# Patient Record
Sex: FEMALE | Race: WHITE | NOT HISPANIC OR LATINO | Employment: FULL TIME | URBAN - METROPOLITAN AREA
[De-identification: names, ages, dates, MRNs, and addresses within clinical notes are randomized per-mention and may not be internally consistent; named-entity substitution may affect disease eponyms.]

---

## 2017-10-09 ENCOUNTER — TRANSCRIBE ORDERS (OUTPATIENT)
Dept: ADMINISTRATIVE | Facility: HOSPITAL | Age: 47
End: 2017-10-09

## 2017-10-09 DIAGNOSIS — E04.2 NONTOXIC MULTINODULAR GOITER: ICD-10-CM

## 2017-10-09 DIAGNOSIS — E04.2 MULTIPLE THYROID NODULES: Primary | ICD-10-CM

## 2017-10-09 DIAGNOSIS — Z00.00 ROUTINE GENERAL MEDICAL EXAMINATION AT A HEALTH CARE FACILITY: ICD-10-CM

## 2017-10-09 DIAGNOSIS — E55.9 VITAMIN D INSUFFICIENCY: ICD-10-CM

## 2017-10-09 DIAGNOSIS — R53.83 OTHER FATIGUE: ICD-10-CM

## 2017-10-10 ENCOUNTER — APPOINTMENT (OUTPATIENT)
Dept: LAB | Facility: HOSPITAL | Age: 47
End: 2017-10-10
Attending: INTERNAL MEDICINE
Payer: COMMERCIAL

## 2017-10-10 ENCOUNTER — TRANSCRIBE ORDERS (OUTPATIENT)
Dept: ADMINISTRATIVE | Facility: HOSPITAL | Age: 47
End: 2017-10-10

## 2017-10-10 ENCOUNTER — HOSPITAL ENCOUNTER (OUTPATIENT)
Dept: RADIOLOGY | Facility: HOSPITAL | Age: 47
Discharge: HOME | End: 2017-10-10
Attending: INTERNAL MEDICINE
Payer: COMMERCIAL

## 2017-10-10 DIAGNOSIS — E04.2 MULTIPLE THYROID NODULES: ICD-10-CM

## 2017-10-10 DIAGNOSIS — L40.0 PSORIASIS VULGARIS: ICD-10-CM

## 2017-10-10 DIAGNOSIS — E04.2 NONTOXIC MULTINODULAR GOITER: ICD-10-CM

## 2017-10-10 DIAGNOSIS — Z00.00 ROUTINE GENERAL MEDICAL EXAMINATION AT A HEALTH CARE FACILITY: ICD-10-CM

## 2017-10-10 DIAGNOSIS — Z79.899 ENCOUNTER FOR LONG-TERM (CURRENT) USE OF MEDICATIONS: ICD-10-CM

## 2017-10-10 DIAGNOSIS — R53.83 OTHER FATIGUE: ICD-10-CM

## 2017-10-10 DIAGNOSIS — L40.0 PSORIASIS VULGARIS: Primary | ICD-10-CM

## 2017-10-10 DIAGNOSIS — E55.9 VITAMIN D INSUFFICIENCY: ICD-10-CM

## 2017-10-10 LAB
25(OH)D3 SERPL-MCNC: 18.6 NG/ML (ref 30–100)
ALBUMIN SERPL BCP-MCNC: 3.7 G/DL (ref 3.5–5)
ALP SERPL-CCNC: 68 U/L (ref 46–116)
ALT SERPL W P-5'-P-CCNC: 28 U/L (ref 12–78)
ANION GAP SERPL CALCULATED.3IONS-SCNC: 9 MMOL/L (ref 4–13)
AST SERPL W P-5'-P-CCNC: 17 U/L (ref 5–45)
BASOPHILS # BLD AUTO: 0 THOUSANDS/ΜL (ref 0–0.1)
BASOPHILS NFR BLD AUTO: 0 % (ref 0–1)
BILIRUB DIRECT SERPL-MCNC: 0.2 MG/DL (ref 0–0.2)
BILIRUB SERPL-MCNC: 0.5 MG/DL (ref 0.2–1)
BILIRUB UR QL STRIP: NEGATIVE
BUN SERPL-MCNC: 11 MG/DL (ref 5–25)
CALCIUM SERPL-MCNC: 9.6 MG/DL (ref 8.3–10.1)
CHLORIDE SERPL-SCNC: 103 MMOL/L (ref 100–108)
CHOLEST SERPL-MCNC: 159 MG/DL (ref 50–200)
CLARITY UR: CLEAR
CO2 SERPL-SCNC: 28 MMOL/L (ref 21–32)
COLOR UR: YELLOW
CREAT SERPL-MCNC: 0.7 MG/DL (ref 0.6–1.3)
EOSINOPHIL # BLD AUTO: 0 THOUSAND/ΜL (ref 0–0.61)
EOSINOPHIL NFR BLD AUTO: 1 % (ref 0–6)
ERYTHROCYTE [DISTWIDTH] IN BLOOD BY AUTOMATED COUNT: 12.3 % (ref 11.6–15.1)
FERRITIN SERPL-MCNC: 44 NG/ML (ref 8–388)
GFR SERPL CREATININE-BSD FRML MDRD: 105 ML/MIN/1.73SQ M
GLUCOSE P FAST SERPL-MCNC: 99 MG/DL (ref 65–99)
GLUCOSE UR STRIP-MCNC: NEGATIVE MG/DL
HCT VFR BLD AUTO: 40.9 % (ref 37–47)
HDLC SERPL-MCNC: 100 MG/DL (ref 40–60)
HGB BLD-MCNC: 13.5 G/DL (ref 12–16)
HGB UR QL STRIP.AUTO: NEGATIVE
KETONES UR STRIP-MCNC: ABNORMAL MG/DL
LDLC SERPL CALC-MCNC: 46 MG/DL (ref 0–100)
LEUKOCYTE ESTERASE UR QL STRIP: NEGATIVE
LYMPHOCYTES # BLD AUTO: 1.4 THOUSANDS/ΜL (ref 0.6–4.47)
LYMPHOCYTES NFR BLD AUTO: 21 % (ref 14–44)
MCH RBC QN AUTO: 30.9 PG (ref 27–31)
MCHC RBC AUTO-ENTMCNC: 33.1 G/DL (ref 31.4–37.4)
MCV RBC AUTO: 93 FL (ref 82–98)
MONOCYTES # BLD AUTO: 0.3 THOUSAND/ΜL (ref 0.17–1.22)
MONOCYTES NFR BLD AUTO: 5 % (ref 4–12)
NEUTROPHILS # BLD AUTO: 4.8 THOUSANDS/ΜL (ref 1.85–7.62)
NEUTS SEG NFR BLD AUTO: 73 % (ref 43–75)
NITRITE UR QL STRIP: NEGATIVE
NRBC BLD AUTO-RTO: 0 /100 WBCS
PH UR STRIP.AUTO: 6.5 [PH] (ref 5–9)
PLATELET # BLD AUTO: 307 THOUSANDS/UL (ref 130–400)
PMV BLD AUTO: 6.6 FL (ref 8.9–12.7)
POTASSIUM SERPL-SCNC: 4.1 MMOL/L (ref 3.5–5.3)
PROT SERPL-MCNC: 7 G/DL (ref 6.4–8.2)
PROT UR STRIP-MCNC: NEGATIVE MG/DL
RBC # BLD AUTO: 4.38 MILLION/UL (ref 4.2–5.4)
SODIUM SERPL-SCNC: 140 MMOL/L (ref 136–145)
SP GR UR STRIP.AUTO: 1.02 (ref 1–1.03)
T4 FREE SERPL-MCNC: 1.01 NG/DL (ref 0.76–1.46)
TRIGL SERPL-MCNC: 64 MG/DL
TSH SERPL DL<=0.05 MIU/L-ACNC: 1.33 UIU/ML (ref 0.36–3.74)
UROBILINOGEN UR QL STRIP.AUTO: 0.2 E.U./DL
VIT B12 SERPL-MCNC: 387 PG/ML (ref 100–900)
WBC # BLD AUTO: 6.6 THOUSAND/UL (ref 4.8–10.8)

## 2017-10-10 PROCEDURE — 82306 VITAMIN D 25 HYDROXY: CPT

## 2017-10-10 PROCEDURE — 81003 URINALYSIS AUTO W/O SCOPE: CPT | Performed by: INTERNAL MEDICINE

## 2017-10-10 PROCEDURE — 36415 COLL VENOUS BLD VENIPUNCTURE: CPT | Performed by: DENTIST

## 2017-10-10 PROCEDURE — 85025 COMPLETE CBC W/AUTO DIFF WBC: CPT | Performed by: DENTIST

## 2017-10-10 PROCEDURE — 80061 LIPID PANEL: CPT

## 2017-10-10 PROCEDURE — 76536 US EXAM OF HEAD AND NECK: CPT

## 2017-10-10 PROCEDURE — 82607 VITAMIN B-12: CPT

## 2017-10-10 PROCEDURE — 84439 ASSAY OF FREE THYROXINE: CPT

## 2017-10-10 PROCEDURE — 80053 COMPREHEN METABOLIC PANEL: CPT | Performed by: DENTIST

## 2017-10-10 PROCEDURE — 84443 ASSAY THYROID STIM HORMONE: CPT

## 2017-10-10 PROCEDURE — 82728 ASSAY OF FERRITIN: CPT

## 2017-10-10 PROCEDURE — 86480 TB TEST CELL IMMUN MEASURE: CPT

## 2017-10-10 PROCEDURE — 82248 BILIRUBIN DIRECT: CPT

## 2017-10-12 LAB
ANNOTATION COMMENT IMP: NORMAL
GAMMA INTERFERON BACKGROUND BLD IA-ACNC: 0.04 IU/ML
M TB IFN-G BLD-IMP: NEGATIVE
M TB IFN-G CD4+ BCKGRND COR BLD-ACNC: <0.01 IU/ML
M TB IFN-G CD4+ T-CELLS BLD-ACNC: 0.03 IU/ML
MITOGEN IGNF BLD-ACNC: 8.71 IU/ML
QUANTIFERON-TB GOLD IN TUBE: NORMAL
SERVICE CMNT-IMP: NORMAL

## 2019-03-04 ENCOUNTER — APPOINTMENT (OUTPATIENT)
Dept: RADIOLOGY | Facility: CLINIC | Age: 49
End: 2019-03-04
Payer: COMMERCIAL

## 2019-03-04 ENCOUNTER — OFFICE VISIT (OUTPATIENT)
Dept: URGENT CARE | Facility: CLINIC | Age: 49
End: 2019-03-04
Payer: COMMERCIAL

## 2019-03-04 VITALS
TEMPERATURE: 99.3 F | HEART RATE: 82 BPM | WEIGHT: 136 LBS | BODY MASS INDEX: 25.03 KG/M2 | OXYGEN SATURATION: 100 % | DIASTOLIC BLOOD PRESSURE: 80 MMHG | HEIGHT: 62 IN | RESPIRATION RATE: 18 BRPM | SYSTOLIC BLOOD PRESSURE: 102 MMHG

## 2019-03-04 DIAGNOSIS — S92.301A CLOSED NONDISPLACED FRACTURE OF METATARSAL BONE OF RIGHT FOOT, UNSPECIFIED METATARSAL, INITIAL ENCOUNTER: Primary | ICD-10-CM

## 2019-03-04 DIAGNOSIS — S99.911A ANKLE INJURY, RIGHT, INITIAL ENCOUNTER: ICD-10-CM

## 2019-03-04 PROCEDURE — 99214 OFFICE O/P EST MOD 30 MIN: CPT | Performed by: PHYSICIAN ASSISTANT

## 2019-03-04 PROCEDURE — 73610 X-RAY EXAM OF ANKLE: CPT

## 2019-03-04 PROCEDURE — 73630 X-RAY EXAM OF FOOT: CPT

## 2019-03-04 RX ORDER — MULTIVITAMIN
1 TABLET ORAL DAILY
COMMUNITY
End: 2021-12-30 | Stop reason: ALTCHOICE

## 2019-03-04 RX ORDER — CHOLECALCIFEROL (VITAMIN D3) 50 MCG
2000 TABLET ORAL DAILY
COMMUNITY
End: 2021-12-30 | Stop reason: ALTCHOICE

## 2019-03-04 RX ORDER — DESONIDE 0.5 MG/G
CREAM TOPICAL DAILY PRN
Refills: 0 | COMMUNITY
Start: 2019-01-07

## 2019-03-04 RX ORDER — MOMETASONE FUROATE 1 MG/G
CREAM TOPICAL DAILY PRN
Refills: 1 | COMMUNITY
Start: 2019-01-05 | End: 2021-12-30 | Stop reason: ALTCHOICE

## 2019-03-04 RX ORDER — LORATADINE AND PSEUDOEPHEDRINE 10; 240 MG/1; MG/1
1 TABLET, EXTENDED RELEASE ORAL DAILY PRN
COMMUNITY
End: 2021-12-30 | Stop reason: ALTCHOICE

## 2019-03-04 RX ORDER — NORGESTIMATE AND ETHINYL ESTRADIOL
1 KIT DAILY
Refills: 3 | COMMUNITY
Start: 2019-01-02 | End: 2021-12-30 | Stop reason: ALTCHOICE

## 2019-03-04 NOTE — PATIENT INSTRUCTIONS
Continue to rest and elevate your foot and ankle when possible  Apply ice for 20-30 minutes at a time, at least 3 to 4 times a day  Wear compression device during all waking hours  You may remove this at bedtime  Wear orthowedge shoe continuously  Use crutches for all walking avoiding any pressure application to the foot  Follow up with Orthopedics in the next 1-3 days  Proceed to the ER if symptoms worsen

## 2019-03-05 ENCOUNTER — TELEPHONE (OUTPATIENT)
Dept: URGENT CARE | Facility: CLINIC | Age: 49
End: 2019-03-05

## 2019-03-05 ENCOUNTER — OFFICE VISIT (OUTPATIENT)
Dept: OBGYN CLINIC | Facility: CLINIC | Age: 49
End: 2019-03-05
Payer: COMMERCIAL

## 2019-03-05 VITALS — SYSTOLIC BLOOD PRESSURE: 121 MMHG | HEART RATE: 80 BPM | DIASTOLIC BLOOD PRESSURE: 82 MMHG

## 2019-03-05 DIAGNOSIS — S93.601A FOOT SPRAIN, RIGHT, INITIAL ENCOUNTER: ICD-10-CM

## 2019-03-05 DIAGNOSIS — S93.401A SPRAIN OF UNSPECIFIED LIGAMENT OF RIGHT ANKLE, INITIAL ENCOUNTER: Primary | ICD-10-CM

## 2019-03-05 PROCEDURE — 99203 OFFICE O/P NEW LOW 30 MIN: CPT | Performed by: ORTHOPAEDIC SURGERY

## 2019-03-05 NOTE — PROGRESS NOTES
Assessment/Plan:  1  Sprain of unspecified ligament of right ankle, initial encounter     2  Foot sprain, right, initial encounter         Scribe Attestation    I,:   Nikki Patricio am acting as a scribe while in the presence of the attending physician :        I,:   Svitlana Zapata, DO personally performed the services described in this documentation    as scribed in my presence :            Doroteo Huynh has right sided ankle and foot pain consistent with a lateral ankle sprain and foot sprain  She can discontinue the use of the crutches and post-op shoe as tolerated  She was given a lace up ankle brace at today's appointment  She should begin gentle range of motion exercises  She can continue with the ice as needed  She will follow up in our office in 3 weeks if she has continued pain  Subjective:   Eugenie Moulton is a 54 y o  female who presents for right sided ankle and foot pain  She states 2 weeks ago she was walking at home and mis-stepped causing her to roll her ankle  She had immediate pain and some swelling in the ankle  She treated the ankle and foot conservatively with ice and elevation  Due to her continued swelling, pain and bruising she went to the urgent care yesterday  X-rays were completed and they were concerned about a metatarsal fracture  She was given crutches, an ace wrap, and a post-op shoe  At today's appointment she states her ankle and foot feel better then 2 weeks ago but she is still having pain over the lateral distal fibula, lateral ankle, and dorsum of the foot  She denies any new trauma or injury since the initial injury 2 weeks  She denies any radiating pain, numbness or tingling  Review of Systems   Constitutional: Negative for chills, fever and unexpected weight change  HENT: Negative for hearing loss, nosebleeds and sore throat  Eyes: Negative for pain, redness and visual disturbance  Respiratory: Negative for cough, shortness of breath and wheezing  Cardiovascular: Negative for chest pain, palpitations and leg swelling  Gastrointestinal: Negative for abdominal pain, nausea and vomiting  Endocrine: Negative for polydipsia and polyuria  Genitourinary: Negative for dysuria and hematuria  Musculoskeletal: Positive for arthralgias, joint swelling and myalgias  See HPI   Skin: Negative for rash and wound  Neurological: Negative for dizziness, numbness and headaches  Psychiatric/Behavioral: Negative for decreased concentration and suicidal ideas  The patient is not nervous/anxious  Past Medical History:   Diagnosis Date    Eczema     Multiple thyroid nodules     Psoriasis     Vitamin D deficiency        Past Surgical History:   Procedure Laterality Date    SHOULDER SURGERY Left        History reviewed  No pertinent family history  Social History     Occupational History    Not on file   Tobacco Use    Smoking status: Former Smoker     Types: Cigarettes    Smokeless tobacco: Never Used   Substance and Sexual Activity    Alcohol use:  Yes     Alcohol/week: 2 4 oz     Types: 4 Glasses of wine per week    Drug use: Never    Sexual activity: Not on file         Current Outpatient Medications:     Cholecalciferol (VITAMIN D) 2000 units tablet, Take 2,000 Units by mouth daily, Disp: , Rfl:     desonide (DESOWEN) 0 05 % cream, daily as needed, Disp: , Rfl: 0    loratadine-pseudoephedrine (CLARITIN-D 24-HOUR)  mg per 24 hr tablet, Take 1 tablet by mouth daily as needed for allergies, Disp: , Rfl:     mometasone (ELOCON) 0 1 % cream, daily as needed, Disp: , Rfl: 1    Multiple Vitamin (MULTIVITAMIN) tablet, Take 1 tablet by mouth daily, Disp: , Rfl:     TRI-LO-RENAE 0 18/0 215/0 25 MG-25 MCG per tablet, Take 1 tablet by mouth daily, Disp: , Rfl: 3    Allergies   Allergen Reactions    Celecoxib Swelling     Redness of ears    Penicillins Other (See Comments)     Unknown - childhood       Objective:  Vitals:    03/05/19 1217   BP: 121/82   Pulse: 80       Right Ankle Exam     Tenderness   The patient is experiencing tenderness in the ATF  Swelling: mild    Range of Motion   Dorsiflexion: normal   Plantar flexion: normal   Eversion: normal   Inversion: normal     Muscle Strength   Dorsiflexion:  5/5  Plantar flexion:  5/5  Anterior tibial:  5/5  Posterior tibial:  5/5  Gastrocsoleus:  5/5  Peroneal muscle:  5/5    Other   Sensation: normal     Comments:  Tenderness over mid-distal fibula, peroneals, mild tenderness over 5th metatarsal,   Tenderness over distal metatarsals 3rd-4th    Ecchymosis over the lateral and medial heel  Ecchymosis over the dorsal and plantar aspect of the of the foot  Pain with resisted eversion          Strength/Myotome Testing     Right Ankle/Foot   Dorsiflexion: 5  Plantar flexion: 5      Physical Exam   Constitutional: She is oriented to person, place, and time  She appears well-developed and well-nourished  HENT:   Head: Normocephalic  Nose: Nose normal    Eyes: Conjunctivae and EOM are normal    Neck: Normal range of motion  Cardiovascular: Normal rate and intact distal pulses  Pulmonary/Chest: Effort normal  No respiratory distress  Abdominal: Soft  She exhibits no distension  Musculoskeletal: Normal range of motion  Neurological: She is alert and oriented to person, place, and time  Skin: Skin is warm and dry  Psychiatric: She has a normal mood and affect  Her behavior is normal  Judgment and thought content normal        I have personally reviewed pertinent films in PACS and my interpretation is as follows: Three view right ankle x-ray taken on 3/4/2019 shows no acute fracture or dislocation  Soft tissue swelling over the lateral malleolus is seen  Three view right foot x-ray taken on 3/4/3019 shows no acute fracture or dislocation  Moderate hallux valgus is present

## 2019-03-27 ENCOUNTER — OFFICE VISIT (OUTPATIENT)
Dept: OBGYN CLINIC | Facility: CLINIC | Age: 49
End: 2019-03-27
Payer: COMMERCIAL

## 2019-03-27 VITALS
HEART RATE: 85 BPM | WEIGHT: 136 LBS | BODY MASS INDEX: 25.68 KG/M2 | DIASTOLIC BLOOD PRESSURE: 87 MMHG | SYSTOLIC BLOOD PRESSURE: 125 MMHG | HEIGHT: 61 IN

## 2019-03-27 DIAGNOSIS — S93.401A SPRAIN OF UNSPECIFIED LIGAMENT OF RIGHT ANKLE, INITIAL ENCOUNTER: Primary | ICD-10-CM

## 2019-03-27 PROCEDURE — 99213 OFFICE O/P EST LOW 20 MIN: CPT | Performed by: ORTHOPAEDIC SURGERY

## 2019-03-27 NOTE — PROGRESS NOTES
Assessment/Plan:  1  Sprain of unspecified ligament of right ankle, initial encounter           Johnice Fothergill has right-sided ankle pain consistent with an ankle sprain  I do think she is progressing well and advised her to continue with home exercises  She should gradually increase her range of motion and strength as needed  I also offered sending her to formal physical therapy but she would like to continue exercises on her own first   She can use the brace as needed as well  She will follow up only if her ankle pain persists  Subjective:   Catheline Osgood is a 54 y o  female who presents  For follow-up for right-sided ankle pain  She was last seen in the office 3 weeks ago with symptoms consistent with a right ankle sprain  She had a negative x-ray at that time after ankle inversion injury  She was given an ankle brace as well  She states that she has improved but still has some continued discomfort over the lateral aspect of the ankle  She also admits to "babying" the ankle and is not putting a lot of pressure through it  She does not feel comfortable yet getting back to exercise but wanted to come in and make sure it was okay to begin using her ankle more  She feels some stiffness throughout the ankle  She denies any numbness or tingling  She denies any new injury  Review of Systems   Constitutional: Negative for chills, fever and unexpected weight change  HENT: Negative for hearing loss, nosebleeds and sore throat  Eyes: Negative for pain, redness and visual disturbance  Respiratory: Negative for cough, shortness of breath and wheezing  Cardiovascular: Negative for chest pain, palpitations and leg swelling  Gastrointestinal: Negative for abdominal pain, nausea and vomiting  Endocrine: Negative for polydipsia and polyuria  Genitourinary: Negative for dysuria and hematuria  Musculoskeletal:        See HPI   Skin: Negative for rash and wound     Neurological: Negative for dizziness, numbness and headaches  Psychiatric/Behavioral: Negative for decreased concentration and suicidal ideas  The patient is not nervous/anxious  Past Medical History:   Diagnosis Date    Eczema     Multiple thyroid nodules     Psoriasis     Vitamin D deficiency        Past Surgical History:   Procedure Laterality Date    SHOULDER SURGERY Left        No family history on file  Social History     Occupational History    Not on file   Tobacco Use    Smoking status: Former Smoker     Types: Cigarettes    Smokeless tobacco: Never Used   Substance and Sexual Activity    Alcohol use: Yes     Alcohol/week: 2 4 oz     Types: 4 Glasses of wine per week    Drug use: Never    Sexual activity: Not on file         Current Outpatient Medications:     Cholecalciferol (VITAMIN D) 2000 units tablet, Take 2,000 Units by mouth daily, Disp: , Rfl:     desonide (DESOWEN) 0 05 % cream, daily as needed, Disp: , Rfl: 0    loratadine-pseudoephedrine (CLARITIN-D 24-HOUR)  mg per 24 hr tablet, Take 1 tablet by mouth daily as needed for allergies, Disp: , Rfl:     mometasone (ELOCON) 0 1 % cream, daily as needed, Disp: , Rfl: 1    Multiple Vitamin (MULTIVITAMIN) tablet, Take 1 tablet by mouth daily, Disp: , Rfl:     TRI-LO-RENAE 0 18/0 215/0 25 MG-25 MCG per tablet, Take 1 tablet by mouth daily, Disp: , Rfl: 3    Allergies   Allergen Reactions    Celecoxib Swelling     Redness of ears    Penicillins Other (See Comments)     Unknown - childhood       Objective:  Vitals:    03/27/19 0834   BP: 125/87   Pulse: 85       Right Ankle Exam     Tenderness   The patient is experiencing tenderness in the ATF and CF  Swelling: none    Range of Motion   The patient has normal right ankle ROM  Muscle Strength   The patient has normal right ankle strength      Tests   Anterior drawer: negative    Other   Erythema: absent  Sensation: normal  Pulse: present           Strength/Myotome Testing     Right Ankle/Foot   Normal strength      Physical Exam   Constitutional: She is oriented to person, place, and time  She appears well-developed and well-nourished  HENT:   Head: Normocephalic and atraumatic  Eyes: Pupils are equal, round, and reactive to light  Conjunctivae are normal    Neck: Normal range of motion  Neck supple  Cardiovascular: Normal rate and intact distal pulses  Pulmonary/Chest: Effort normal  No respiratory distress  Musculoskeletal:   As noted in HPI   Neurological: She is alert and oriented to person, place, and time  Skin: Skin is warm and dry  Psychiatric: She has a normal mood and affect  Her behavior is normal    Vitals reviewed

## 2019-06-26 ENCOUNTER — APPOINTMENT (OUTPATIENT)
Dept: LAB | Facility: CLINIC | Age: 49
End: 2019-06-26
Payer: COMMERCIAL

## 2019-06-26 ENCOUNTER — OFFICE VISIT (OUTPATIENT)
Dept: RHEUMATOLOGY | Facility: CLINIC | Age: 49
End: 2019-06-26
Payer: COMMERCIAL

## 2019-06-26 ENCOUNTER — APPOINTMENT (OUTPATIENT)
Dept: RADIOLOGY | Facility: CLINIC | Age: 49
End: 2019-06-26
Payer: COMMERCIAL

## 2019-06-26 VITALS
SYSTOLIC BLOOD PRESSURE: 152 MMHG | HEART RATE: 101 BPM | DIASTOLIC BLOOD PRESSURE: 99 MMHG | HEIGHT: 62 IN | WEIGHT: 134 LBS | BODY MASS INDEX: 24.66 KG/M2

## 2019-06-26 DIAGNOSIS — M25.50 DIFFUSE ARTHRALGIA: ICD-10-CM

## 2019-06-26 DIAGNOSIS — L40.9 PSORIASIS: ICD-10-CM

## 2019-06-26 DIAGNOSIS — L40.9 PSORIASIS: Primary | ICD-10-CM

## 2019-06-26 LAB
ALBUMIN SERPL BCP-MCNC: 3.8 G/DL (ref 3.5–5)
ALP SERPL-CCNC: 65 U/L (ref 46–116)
ALT SERPL W P-5'-P-CCNC: 18 U/L (ref 12–78)
ANION GAP SERPL CALCULATED.3IONS-SCNC: 6 MMOL/L (ref 4–13)
AST SERPL W P-5'-P-CCNC: 12 U/L (ref 5–45)
BASOPHILS # BLD AUTO: 0.05 THOUSANDS/ΜL (ref 0–0.1)
BASOPHILS NFR BLD AUTO: 1 % (ref 0–1)
BILIRUB SERPL-MCNC: 0.36 MG/DL (ref 0.2–1)
BUN SERPL-MCNC: 8 MG/DL (ref 5–25)
CALCIUM SERPL-MCNC: 9.3 MG/DL (ref 8.3–10.1)
CHLORIDE SERPL-SCNC: 107 MMOL/L (ref 100–108)
CO2 SERPL-SCNC: 26 MMOL/L (ref 21–32)
CREAT SERPL-MCNC: 0.7 MG/DL (ref 0.6–1.3)
CRP SERPL QL: 5.8 MG/L
EOSINOPHIL # BLD AUTO: 0.08 THOUSAND/ΜL (ref 0–0.61)
EOSINOPHIL NFR BLD AUTO: 1 % (ref 0–6)
ERYTHROCYTE [DISTWIDTH] IN BLOOD BY AUTOMATED COUNT: 11.7 % (ref 11.6–15.1)
ERYTHROCYTE [SEDIMENTATION RATE] IN BLOOD: 6 MM/HOUR (ref 0–20)
GFR SERPL CREATININE-BSD FRML MDRD: 104 ML/MIN/1.73SQ M
GLUCOSE SERPL-MCNC: 76 MG/DL (ref 65–140)
HCT VFR BLD AUTO: 40.3 % (ref 34.8–46.1)
HGB BLD-MCNC: 13.1 G/DL (ref 11.5–15.4)
IMM GRANULOCYTES # BLD AUTO: 0.02 THOUSAND/UL (ref 0–0.2)
IMM GRANULOCYTES NFR BLD AUTO: 0 % (ref 0–2)
LYMPHOCYTES # BLD AUTO: 1.42 THOUSANDS/ΜL (ref 0.6–4.47)
LYMPHOCYTES NFR BLD AUTO: 23 % (ref 14–44)
MCH RBC QN AUTO: 31 PG (ref 26.8–34.3)
MCHC RBC AUTO-ENTMCNC: 32.5 G/DL (ref 31.4–37.4)
MCV RBC AUTO: 96 FL (ref 82–98)
MONOCYTES # BLD AUTO: 0.41 THOUSAND/ΜL (ref 0.17–1.22)
MONOCYTES NFR BLD AUTO: 7 % (ref 4–12)
NEUTROPHILS # BLD AUTO: 4.2 THOUSANDS/ΜL (ref 1.85–7.62)
NEUTS SEG NFR BLD AUTO: 68 % (ref 43–75)
NRBC BLD AUTO-RTO: 0 /100 WBCS
PLATELET # BLD AUTO: 309 THOUSANDS/UL (ref 149–390)
PMV BLD AUTO: 9.3 FL (ref 8.9–12.7)
POTASSIUM SERPL-SCNC: 4.1 MMOL/L (ref 3.5–5.3)
PROT SERPL-MCNC: 6.8 G/DL (ref 6.4–8.2)
RBC # BLD AUTO: 4.22 MILLION/UL (ref 3.81–5.12)
SODIUM SERPL-SCNC: 139 MMOL/L (ref 136–145)
WBC # BLD AUTO: 6.18 THOUSAND/UL (ref 4.31–10.16)

## 2019-06-26 PROCEDURE — 86705 HEP B CORE ANTIBODY IGM: CPT

## 2019-06-26 PROCEDURE — 86200 CCP ANTIBODY: CPT

## 2019-06-26 PROCEDURE — 73130 X-RAY EXAM OF HAND: CPT

## 2019-06-26 PROCEDURE — 86803 HEPATITIS C AB TEST: CPT

## 2019-06-26 PROCEDURE — 72202 X-RAY EXAM SI JOINTS 3/> VWS: CPT

## 2019-06-26 PROCEDURE — 86430 RHEUMATOID FACTOR TEST QUAL: CPT

## 2019-06-26 PROCEDURE — 36415 COLL VENOUS BLD VENIPUNCTURE: CPT

## 2019-06-26 PROCEDURE — 85025 COMPLETE CBC W/AUTO DIFF WBC: CPT

## 2019-06-26 PROCEDURE — 80053 COMPREHEN METABOLIC PANEL: CPT

## 2019-06-26 PROCEDURE — 86140 C-REACTIVE PROTEIN: CPT

## 2019-06-26 PROCEDURE — 86704 HEP B CORE ANTIBODY TOTAL: CPT

## 2019-06-26 PROCEDURE — 73502 X-RAY EXAM HIP UNI 2-3 VIEWS: CPT

## 2019-06-26 PROCEDURE — 99205 OFFICE O/P NEW HI 60 MIN: CPT | Performed by: INTERNAL MEDICINE

## 2019-06-26 PROCEDURE — 85652 RBC SED RATE AUTOMATED: CPT

## 2019-06-26 PROCEDURE — 87340 HEPATITIS B SURFACE AG IA: CPT

## 2019-06-26 RX ORDER — APREMILAST 30 MG/1
TABLET, FILM COATED ORAL
COMMUNITY
Start: 2019-05-23 | End: 2021-12-30 | Stop reason: ALTCHOICE

## 2019-06-27 LAB
HBV CORE AB SER QL: NORMAL
HBV CORE IGM SER QL: NORMAL
HBV SURFACE AG SER QL: NORMAL
HCV AB SER QL: NORMAL
RHEUMATOID FACT SER QL LA: NEGATIVE

## 2019-06-29 LAB — CCP IGA+IGG SERPL IA-ACNC: 2 UNITS (ref 0–19)

## 2019-07-01 ENCOUNTER — TELEPHONE (OUTPATIENT)
Dept: OBGYN CLINIC | Facility: CLINIC | Age: 49
End: 2019-07-01

## 2019-07-01 DIAGNOSIS — M16.12 PRIMARY OSTEOARTHRITIS OF LEFT HIP: Primary | ICD-10-CM

## 2019-07-01 NOTE — TELEPHONE ENCOUNTER
Patient was advised  She would like to know what the next step would be for the L hip and her lower back pain that she has  Also she would like to know why you ordered the labs that you did, what exactly they would be looking for  Patient also requested that we send her labs to her PCP    ----- Message from Shahrzad Gloria MD sent at 7/1/2019  9:53 AM EDT -----  Please let her know the labs and XR's were normal (showed mild osteoarthritis in hands)  We will continue to monitor her symptoms while on the [place]  I will see her back as scheduled  Thanks

## 2019-07-17 ENCOUNTER — HOSPITAL ENCOUNTER (OUTPATIENT)
Dept: RADIOLOGY | Facility: HOSPITAL | Age: 49
Discharge: HOME | End: 2019-07-17
Payer: COMMERCIAL

## 2019-07-17 ENCOUNTER — TRANSCRIBE ORDERS (OUTPATIENT)
Dept: ADMINISTRATIVE | Facility: HOSPITAL | Age: 49
End: 2019-07-17

## 2019-07-17 DIAGNOSIS — E04.2 MULTIPLE THYROID NODULES: ICD-10-CM

## 2019-07-17 DIAGNOSIS — E04.2 MULTIPLE THYROID NODULES: Primary | ICD-10-CM

## 2019-07-17 PROCEDURE — 76536 US EXAM OF HEAD AND NECK: CPT

## 2021-05-18 ENCOUNTER — IMMUNIZATIONS (OUTPATIENT)
Dept: FAMILY MEDICINE CLINIC | Facility: HOSPITAL | Age: 51
End: 2021-05-18

## 2021-05-18 DIAGNOSIS — Z23 ENCOUNTER FOR IMMUNIZATION: Primary | ICD-10-CM

## 2021-05-18 PROCEDURE — 91300 SARS-COV-2 / COVID-19 MRNA VACCINE (PFIZER-BIONTECH) 30 MCG: CPT

## 2021-05-18 PROCEDURE — 0001A SARS-COV-2 / COVID-19 MRNA VACCINE (PFIZER-BIONTECH) 30 MCG: CPT

## 2021-06-16 ENCOUNTER — IMMUNIZATIONS (OUTPATIENT)
Dept: FAMILY MEDICINE CLINIC | Facility: HOSPITAL | Age: 51
End: 2021-06-16

## 2021-06-16 DIAGNOSIS — Z23 ENCOUNTER FOR IMMUNIZATION: Primary | ICD-10-CM

## 2021-06-16 PROCEDURE — 91300 SARS-COV-2 / COVID-19 MRNA VACCINE (PFIZER-BIONTECH) 30 MCG: CPT

## 2021-06-16 PROCEDURE — 0002A SARS-COV-2 / COVID-19 MRNA VACCINE (PFIZER-BIONTECH) 30 MCG: CPT

## 2021-12-17 ENCOUNTER — IMMUNIZATIONS (OUTPATIENT)
Dept: FAMILY MEDICINE CLINIC | Facility: HOSPITAL | Age: 51
End: 2021-12-17

## 2021-12-17 DIAGNOSIS — Z23 ENCOUNTER FOR IMMUNIZATION: Primary | ICD-10-CM

## 2021-12-17 PROCEDURE — 0064A COVID-19 MODERNA VACC 0.25 ML BOOSTER: CPT

## 2021-12-17 PROCEDURE — 91306 COVID-19 MODERNA VACC 0.25 ML BOOSTER: CPT

## 2021-12-30 ENCOUNTER — OFFICE VISIT (OUTPATIENT)
Dept: INTERNAL MEDICINE CLINIC | Facility: CLINIC | Age: 51
End: 2021-12-30
Payer: COMMERCIAL

## 2021-12-30 VITALS
SYSTOLIC BLOOD PRESSURE: 124 MMHG | TEMPERATURE: 98.5 F | HEIGHT: 61 IN | OXYGEN SATURATION: 99 % | WEIGHT: 139 LBS | HEART RATE: 95 BPM | DIASTOLIC BLOOD PRESSURE: 68 MMHG | BODY MASS INDEX: 26.24 KG/M2

## 2021-12-30 DIAGNOSIS — K21.9 GASTROESOPHAGEAL REFLUX DISEASE WITHOUT ESOPHAGITIS: ICD-10-CM

## 2021-12-30 DIAGNOSIS — E55.9 VITAMIN D DEFICIENCY: ICD-10-CM

## 2021-12-30 DIAGNOSIS — Z00.00 ANNUAL PHYSICAL EXAM: Primary | ICD-10-CM

## 2021-12-30 DIAGNOSIS — R74.8 ELEVATED LIVER ENZYMES: ICD-10-CM

## 2021-12-30 DIAGNOSIS — L40.9 PSORIASIS: ICD-10-CM

## 2021-12-30 DIAGNOSIS — J01.00 ACUTE NON-RECURRENT MAXILLARY SINUSITIS: ICD-10-CM

## 2021-12-30 PROCEDURE — 99213 OFFICE O/P EST LOW 20 MIN: CPT | Performed by: INTERNAL MEDICINE

## 2021-12-30 PROCEDURE — 99396 PREV VISIT EST AGE 40-64: CPT | Performed by: INTERNAL MEDICINE

## 2021-12-30 RX ORDER — PANTOPRAZOLE SODIUM 40 MG/1
40 TABLET, DELAYED RELEASE ORAL DAILY
Qty: 30 TABLET | Refills: 2 | Status: SHIPPED | OUTPATIENT
Start: 2021-12-30

## 2021-12-30 RX ORDER — PANTOPRAZOLE SODIUM 40 MG/1
40 TABLET, DELAYED RELEASE ORAL DAILY
COMMUNITY
End: 2021-12-30 | Stop reason: SDUPTHER

## 2021-12-30 RX ORDER — AZITHROMYCIN 250 MG/1
TABLET, FILM COATED ORAL
Qty: 6 TABLET | Refills: 0 | Status: SHIPPED | OUTPATIENT
Start: 2021-12-30 | End: 2022-01-04

## 2021-12-30 RX ORDER — FAMOTIDINE 20 MG/1
20 TABLET, FILM COATED ORAL AS NEEDED
COMMUNITY

## 2022-06-06 NOTE — PROGRESS NOTES
[facility]        NAME: Timi Regan is a 54 y o  female  : 1970    MRN: [id number]  DATE: 2019  TIME: 4:54 PM    Assessment and Plan   Closed nondisplaced fracture of metatarsal bone of right foot, unspecified metatarsal, initial encounter [S92 301A]  1  Closed nondisplaced fracture of metatarsal bone of right foot, unspecified metatarsal, initial encounter  XR ankle 3+ vw right    XR foot 3+ vw right    Ambulatory referral to Orthopedic Surgery    Compression bandages    Orthowedge Shoe    Crutches     Compression bandage and ortho wedge shoe applied in office  Crutches provided  Patient Instructions   Continue to rest and elevate your foot and ankle when possible  Apply ice for 20-30 minutes at a time, at least 3 to 4 times a day  Wear compression device during all waking hours  You may remove this at bedtime  Wear orthowedge shoe continuously  Use crutches for all walking avoiding any pressure application to the foot  Follow up with Orthopedics in the next 1-3 days  Proceed to the ER if symptoms worsen  Notified patient provider would call with final x-ray report  The diagnosis, expected course of injury, and treatment plan were reviewed  Pt provided with orthopedic contact and advised to schedule f/u appt  All questions answered  Precautions given  Patient verbalized understanding and agreement the treatment plan  Chief Complaint     Chief Complaint   Patient presents with    Ankle Injury     19 - twisted R ankle inward after stepping off of strp onto dog toy  Continues with pain, swelling and bruising lateral R ankle into foot  Used ice, ACE wrap anf Motrin prn  History of Present Illness   74-year-old female presenting with complaint right ankle pain x 1 week  Patient states she initially injured her right foot and ankle after stepping off of a stair onto a round dog toy   She notes her foot rolled out in front of her and that her ankle rolled inwards causing her to fall onto her right hip and leg  She notes pain, swelling, and bruising since 1 hour after the injury, that has been constant and unchanged since the injury  She notes bruising is somewhat more green now, but swelling has been mostly unchanged  Pain is worse over the outer right ankle, but is also felt over the back of the foot  Patient notes she is able to walk and apply pressure however is limiting this secondary to discomfort  No numbness tingling or weakness  She has attempted treating with ice, an ace wrap, and Motrin but little relief  No previous injury  No history of bone weakness  Review of Systems   Review of Systems   Constitutional: Negative for chills, diaphoresis and fever  Musculoskeletal: Negative for back pain, myalgias and neck pain  Skin: Positive for color change  Negative for rash and wound  Neurological: Negative for dizziness, light-headedness and headaches       Current Medications       Current Outpatient Medications:     Cholecalciferol (VITAMIN D) 2000 units tablet, Take 2,000 Units by mouth daily, Disp: , Rfl:     desonide (DESOWEN) 0 05 % cream, daily as needed, Disp: , Rfl: 0    loratadine-pseudoephedrine (CLARITIN-D 24-HOUR)  mg per 24 hr tablet, Take 1 tablet by mouth daily as needed for allergies, Disp: , Rfl:     mometasone (ELOCON) 0 1 % cream, daily as needed, Disp: , Rfl: 1    Multiple Vitamin (MULTIVITAMIN) tablet, Take 1 tablet by mouth daily, Disp: , Rfl:     TRI-LO-RENAE 0 18/0 215/0 25 MG-25 MCG per tablet, Take 1 tablet by mouth daily, Disp: , Rfl: 3    Current Allergies     Allergies as of 03/04/2019 - Reviewed 03/04/2019   Allergen Reaction Noted    Celecoxib Swelling 02/16/2016    Penicillins Other (See Comments) 02/16/2016          The following portions of the patient's history were reviewed and updated as appropriate: allergies, current medications, past family history, past medical history, past social history, past surgical history and problem list      Past Medical History:   Diagnosis Date    Eczema     Multiple thyroid nodules     Psoriasis     Vitamin D deficiency        Past Surgical History:   Procedure Laterality Date    SHOULDER SURGERY Left      No family history on file  Medications have been verified  Objective   /80 (BP Location: Right arm, Patient Position: Sitting, Cuff Size: Standard)   Pulse 82   Temp 99 3 °F (37 4 °C) (Tympanic)   Resp 18   Ht 5' 2" (1 575 m)   Wt 61 7 kg (136 lb)   LMP 02/15/2019 (Exact Date)   SpO2 100%   BMI 24 87 kg/m²     Ankle XR:  Questionable fracture of the distal fibula  Will await final report  Foot XR: Nondisplaced fracture of the base of the 2nd and 4th metatarsals  Will await final report  Physical Exam     Physical Exam   Constitutional: She is oriented to person, place, and time  Vital signs are normal  She appears well-developed and well-nourished  She is cooperative  She does not appear ill  No distress  HENT:   Head: Normocephalic and atraumatic  Eyes: Conjunctivae and lids are normal  Right eye exhibits no chemosis, no discharge and no exudate  Left eye exhibits no chemosis, no discharge and no exudate  Right conjunctiva is not injected  Left conjunctiva is not injected  Cardiovascular: Normal rate, regular rhythm and normal heart sounds  Exam reveals no gallop, no distant heart sounds and no friction rub  No murmur heard  Pulmonary/Chest: Effort normal and breath sounds normal  No stridor  No respiratory distress  She has no decreased breath sounds  She has no wheezes  She has no rhonchi  She has no rales  Abdominal: Soft  Bowel sounds are normal  She exhibits no distension and no mass  There is no tenderness  There is no rigidity, no rebound and no guarding  Musculoskeletal:        Right ankle: She exhibits decreased range of motion, swelling (over the lateral malleolus and AITFL ) and ecchymosis (purple to green)   She exhibits no deformity, no laceration and normal pulse  Tenderness  Lateral malleolus and AITFL tenderness found  Achilles tendon normal  Achilles tendon exhibits no pain, no defect and normal Harrison's test results  Right foot: There is tenderness (over the dorsal and plantar aspect of the mid foot), bony tenderness (over metatarsals 2-4  ) and swelling  There is normal range of motion, normal capillary refill, no crepitus, no deformity and no laceration  Ecchymosis and edema overlying the dorsal foot  There is faint ecchymosis of the mid to lateral plantar foot  Neurological: She is alert and oriented to person, place, and time  She has normal strength  She is not disoriented  No cranial nerve deficit  She exhibits normal muscle tone  Coordination and gait normal    Skin: Skin is warm, dry and intact  No rash noted  She is not diaphoretic  No erythema  No pallor  Psychiatric: She has a normal mood and affect  Her behavior is normal  Judgment and thought content normal    Nursing note and vitals reviewed  SCDs

## 2022-08-22 NOTE — TELEPHONE ENCOUNTER
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 Post-transplant course complicated by acute respiratory failure requiring intubation s/p trach 8/4, now off vent on trach collar.    # Acute on chronic hypoxemic respiratory failure   # s/p heart/lung transplant  # Candida pneumonia  # Pericardial effusion  # Atrial fibrillation    - Tolerating trach collar. Tentative plan to downsize trach today.  - Continue airway clearance  - Incentive spirometer  - Follow up BAL 8/21 results  - 8/17 BAL with candida. On inhaled ampho, cresemba. Off caspofungin (8/12-8/19). ID follow up  - Favor keeping net even to slightly net negative from fluid standpoint  - Immunosuppression regimen as per transplant pulmonology  - Flecainide and metoprolol for AF  - On heparin gtt. Tolerating.  - TTE 8/19 shows moderate pericardial effusion lateral to LV. No echocardiographic evidence of tamponade.

## 2022-10-12 PROBLEM — J01.00 ACUTE NON-RECURRENT MAXILLARY SINUSITIS: Status: RESOLVED | Noted: 2021-12-30 | Resolved: 2022-10-12

## 2022-10-14 ENCOUNTER — TELEMEDICINE (OUTPATIENT)
Dept: INTERNAL MEDICINE CLINIC | Facility: CLINIC | Age: 52
End: 2022-10-14
Payer: COMMERCIAL

## 2022-10-14 DIAGNOSIS — R74.8 ELEVATED LIVER ENZYMES: ICD-10-CM

## 2022-10-14 DIAGNOSIS — L40.9 PSORIASIS: ICD-10-CM

## 2022-10-14 DIAGNOSIS — U07.1 COVID-19: Primary | ICD-10-CM

## 2022-10-14 DIAGNOSIS — K21.9 GASTROESOPHAGEAL REFLUX DISEASE WITHOUT ESOPHAGITIS: ICD-10-CM

## 2022-10-14 DIAGNOSIS — E55.9 VITAMIN D DEFICIENCY: ICD-10-CM

## 2022-10-14 PROCEDURE — 99213 OFFICE O/P EST LOW 20 MIN: CPT | Performed by: INTERNAL MEDICINE

## 2022-10-14 RX ORDER — NIRMATRELVIR AND RITONAVIR 300-100 MG
3 KIT ORAL 2 TIMES DAILY
Qty: 30 TABLET | Refills: 0 | Status: SHIPPED | OUTPATIENT
Start: 2022-10-14 | End: 2022-10-19

## 2022-10-14 RX ORDER — DIPHENOXYLATE HYDROCHLORIDE AND ATROPINE SULFATE 2.5; .025 MG/1; MG/1
1 TABLET ORAL DAILY
COMMUNITY

## 2022-10-14 NOTE — ASSESSMENT & PLAN NOTE
When I saw her December 2021 she was advised to go for repeat liver enzymes as well as further workup for elevated liver enzymes  But patient did not have time to go for blood test yet  Patient significantly decreased her alcohol intake  Advised to have a follow-up liver enzymes and workup as requested before

## 2022-10-14 NOTE — ASSESSMENT & PLAN NOTE
Patient states he is not taking vitamin-D separately she takes vitamin-D in her multivitamin tablet  Will check vitamin-D level and advise accordingly  Patient advised her vitamin-D due to recent COVID infection as above

## 2022-10-14 NOTE — PROGRESS NOTES
Virtual Regular Visit    Verification of patient location:    Patient is located in the following state in which I hold an active license [place]      Assessment/Plan:    Problem List Items Addressed This Visit        Digestive    GE reflux     She takes pantoprazole occasionally  She has been watching her diet  Musculoskeletal and Integument    Psoriasis     She has been seen and followed by dermatologist   On topical therapy  Other    Vitamin D deficiency     Patient states he is not taking vitamin-D separately she takes vitamin-D in her multivitamin tablet  Will check vitamin-D level and advise accordingly  Patient advised her vitamin-D due to recent COVID infection as above  Relevant Orders    Comprehensive metabolic panel    Vitamin D 25 hydroxy    Elevated liver enzymes     When I saw her December 2021 she was advised to go for repeat liver enzymes as well as further workup for elevated liver enzymes  But patient did not have time to go for blood test yet  Patient significantly decreased her alcohol intake  Advised to have a follow-up liver enzymes and workup as requested before  Relevant Orders    Comprehensive metabolic panel    DANIELE Screen w/ Reflex to Titer/Pattern    Ferritin    Antimitochondrial antibody    Alpha-1-antitrypsin    COVID-19 - Primary     She had a last booster does of COVID-19 vaccine in December 2021  Today is day 4  Of onset of her symptoms  Still has significant fatigue, some cough, sore throat and low-grade fever discussed with the patient about starting paxlovid  Patient agreed so started  Advised to increase fluids  Take acetaminophen p r n  for fever  Mucinex DM for cough  Start vitamin-D 2000 International Units daily and zinc 50 mg daily           Relevant Medications    nirmatrelvir & ritonavir (Paxlovid, 300/100,) tablet therapy pack    Other Relevant Orders    Comprehensive metabolic panel               Reason for visit is   Chief Complaint   Patient presents with   • Virtual Regular Visit        Encounter provider Fallon Leon MD    Provider located at [address] [place] [id number] [phone]      Recent Visits  No visits were found meeting these conditions  Showing recent visits within past 7 days and meeting all other requirements  Today's Visits  Date Type Provider Dept   10/14/22 Telemedicine Fallon Leon MD [facility]   Showing today's visits and meeting all other requirements  Future Appointments  No visits were found meeting these conditions  Showing future appointments within next 150 days and meeting all other requirements       The patient was identified by name and date of birth  Smooth Adame was informed that this is a telemedicine visit and that the visit is being conducted through [address] [place] [address] Now and patient was informed that this is a secure, HIPAA-compliant platform  She agrees to proceed     My office door was closed  No one else was in the room  She acknowledged consent and understanding of privacy and security of the video platform  The patient has agreed to participate and understands they can discontinue the visit at any time  Patient is aware this is a billable service  Subjective; cough, fatigue, sore throat, fever and COVID-19 positive  Smooth Adame is a 50 y o  female    HPI   82-year-old white female patient who developed cough, sore throat, fever and fatigue on 10/12/2022  She checked for COVID-19 test yesterday and it was positive  Denies any chest pain or shortness of breath  Had some nausea but better  Has some loose BM  Denies pain in abdomen  Denies vomiting  Due to persistent symptoms she is concerned about COVID 19 infection  She had a vaccination for COVID-19 and 1 booster does  Last booster dose was December 2021    She did not have  updated COVID-19 booster dose yet  Her her  is also sick at home per patient  Past Medical History:   Diagnosis Date   • Acute non-recurrent maxillary sinusitis 12/30/2021   • DANIELE positive    • COVID-19 10/14/2022   • Eczema    • Elevated liver enzymes 12/30/2021   • GE reflux 12/30/2021   • GERD (gastroesophageal reflux disease)    • Joint pain    • Multiple thyroid nodules    • Psoriasis 12/30/2021   • Rhinitis, allergic    • Vitamin D deficiency 12/30/2021       Past Surgical History:   Procedure Laterality Date   • ANAL FISSURECTOMY     • MAMMO (HISTORICAL) Bilateral    • SHOULDER SURGERY Left        Current Outpatient Medications   Medication Sig Dispense Refill   • multivitamin (THERAGRAN) TABS Take 1 tablet by mouth daily     • nirmatrelvir & ritonavir (Paxlovid, 300/100,) tablet therapy pack Take 3 tablets by mouth 2 (two) times a day for 5 days Take 2 nirmatrelvir tablets + 1 ritonavir tablet together per dose 30 tablet 0   • desonide (DESOWEN) 0 05 % cream daily as needed  0   • pantoprazole (PROTONIX) 40 mg tablet Take 1 tablet (40 mg total) by mouth daily 30 tablet 2     No current facility-administered medications for this visit  Allergies   Allergen Reactions   • Celecoxib Swelling     Redness of ears   • Penicillins Other (See Comments)     Unknown - childhood       Review of Systems   Constitutional: Positive for fatigue and fever  HENT: Positive for congestion and sore throat  Respiratory: Positive for cough  Negative for shortness of breath and wheezing  Cardiovascular: Negative for chest pain  Gastrointestinal: Positive for diarrhea (Like loose BMs)  Musculoskeletal: Negative for arthralgias and myalgias  Neurological: Negative for dizziness, weakness and headaches  Psychiatric/Behavioral: Negative for agitation and behavioral problems  Video Exam    There were no vitals filed for this visit      Physical Exam  Constitutional:       General: She is not in acute distress  HENT:      Head: Normocephalic  Eyes:      General:         Right eye: No discharge  Left eye: No discharge  Pulmonary:      Effort: Pulmonary effort is normal  No respiratory distress  Musculoskeletal:         General: Normal range of motion  Cervical back: Normal range of motion  Neurological:      General: No focal deficit present  Mental Status: She is alert and oriented to person, place, and time     Psychiatric:         Mood and Affect: Mood normal          Behavior: Behavior normal           I spent 20 minutes directly with the patient during this visit

## 2022-10-14 NOTE — ASSESSMENT & PLAN NOTE
She had a last booster does of COVID-19 vaccine in December 2021  Today is day 4  Of onset of her symptoms  Still has significant fatigue, some cough, sore throat and low-grade fever discussed with the patient about starting paxlovid  Patient agreed so started  Advised to increase fluids  Take acetaminophen p r n  for fever  Mucinex DM for cough  Start vitamin-D 2000 International Units daily and zinc 50 mg daily

## 2022-12-05 ENCOUNTER — TELEPHONE (OUTPATIENT)
Dept: INTERNAL MEDICINE CLINIC | Facility: CLINIC | Age: 52
End: 2022-12-05

## 2022-12-05 DIAGNOSIS — J40 BRONCHITIS: Primary | ICD-10-CM

## 2022-12-05 RX ORDER — AZITHROMYCIN 250 MG/1
TABLET, FILM COATED ORAL
Qty: 6 TABLET | Refills: 0 | Status: SHIPPED | OUTPATIENT
Start: 2022-12-05 | End: 2022-12-10

## 2022-12-05 NOTE — TELEPHONE ENCOUNTER
Pt started with mild fever, coughing, congestion, discomfort in her throat from coughing and luz maria on her way back from [place] on 11/20 - tested 2x for Covid, both negative - she took Mucinex and Day/Night Quil and seemed to get better now it is coming back    Would you be able to send in an antibiotic?     She uses [facility] in [facility]

## 2022-12-19 ENCOUNTER — TELEPHONE (OUTPATIENT)
Dept: INTERNAL MEDICINE CLINIC | Facility: CLINIC | Age: 52
End: 2022-12-19

## 2022-12-19 DIAGNOSIS — M25.562 ACUTE PAIN OF LEFT KNEE: Primary | ICD-10-CM

## 2022-12-30 ENCOUNTER — OFFICE VISIT (OUTPATIENT)
Dept: OBGYN CLINIC | Facility: CLINIC | Age: 52
End: 2022-12-30

## 2022-12-30 ENCOUNTER — APPOINTMENT (OUTPATIENT)
Dept: RADIOLOGY | Facility: CLINIC | Age: 52
End: 2022-12-30

## 2022-12-30 VITALS
WEIGHT: 139 LBS | DIASTOLIC BLOOD PRESSURE: 80 MMHG | BODY MASS INDEX: 26.24 KG/M2 | HEIGHT: 61 IN | HEART RATE: 80 BPM | SYSTOLIC BLOOD PRESSURE: 140 MMHG

## 2022-12-30 DIAGNOSIS — M25.562 ACUTE PAIN OF LEFT KNEE: ICD-10-CM

## 2022-12-30 DIAGNOSIS — M22.42 CHONDROMALACIA PATELLAE OF LEFT KNEE: Primary | ICD-10-CM

## 2022-12-30 DIAGNOSIS — Z01.89 ENCOUNTER FOR LOWER EXTREMITY COMPARISON IMAGING STUDY: ICD-10-CM

## 2022-12-30 NOTE — PROGRESS NOTES
Assessment/Plan:  1  Chondromalacia patellae of left knee  Ambulatory Referral to Physical Therapy      2  Acute pain of left knee  Ambulatory Referral to Orthopedic Surgery    XR knee 3 vw left non injury    Ambulatory Referral to Physical Therapy          Patient is a pleasant 50year old who presents to the office today for evaluation of left knee pain due to patellar chondromalacia and patellofemoral syndrome  Will refer patient to PT for quad strengthening prior to her initiation of her upcoming fitness program, she may also use voltaren gel as needed for her anterior knee pain  Counseled patient on the need to avoid aggravating activities as her pain and symptoms are limited to times when she has direct pressure over the anterior aspect of the knee  Will see patient back as needed  Patient ID: Jose Guadalupe Estrella is a 50 y o  female    Subjective: patient presents for evaluation of left knee pain, this has been ongoing for several months, pain is localized to the anterior aspect of the knee, without radiation  Pain is worse with kneeling on the knee and relieved somewhat with rest and avoiding kneeling  Pain can be sharp and severe at times  Patient has tried occasional anti-inflammatories in the past without significant relief of their symptoms  Review of Systems   Constitutional: Positive for activity change  HENT: Negative for hearing loss  Eyes: Negative for discharge and redness  Respiratory: Negative for cough  Cardiovascular: Negative for chest pain  Gastrointestinal: Negative for diarrhea and vomiting  Musculoskeletal: Positive for arthralgias  Negative for gait problem and joint swelling  Skin: Negative for rash and wound  Neurological: Negative for speech difficulty and numbness  Psychiatric/Behavioral: Negative for agitation           Past Medical History:   Diagnosis Date   • Acute non-recurrent maxillary sinusitis 12/30/2021   • DANIELE positive    • COVID-19 10/14/2022 • Eczema    • Elevated liver enzymes 2021   • GE reflux 2021   • GERD (gastroesophageal reflux disease)    • Joint pain    • Multiple thyroid nodules    • Psoriasis 2021   • Rhinitis, allergic    • Vitamin D deficiency 2021       Past Surgical History:   Procedure Laterality Date   • ANAL FISSURECTOMY     • MAMMO (HISTORICAL) Bilateral    • SHOULDER SURGERY Left        Family History   Problem Relation Age of Onset   • No Known Problems Mother    • Eczema Father    • COPD Father    • No Known Problems Sister    • No Known Problems Brother    • No Known Problems Maternal Grandmother    • No Known Problems Maternal Grandfather    • Psoriasis Paternal Grandmother    • No Known Problems Paternal Grandfather    • No Known Problems Maternal Aunt    • No Known Problems Maternal Uncle    • No Known Problems Paternal Aunt    • No Known Problems Paternal Uncle        Social History     Occupational History   • Not on file   Tobacco Use   • Smoking status: Former     Types: Cigarettes     Quit date: 2004     Years since quittin 5   • Smokeless tobacco: Never   Vaping Use   • Vaping Use: Never used   Substance and Sexual Activity   • Alcohol use: Yes     Alcohol/week: 4 0 standard drinks     Types: 4 Glasses of wine per week     Comment: few times a week    • Drug use: Never   • Sexual activity: Yes         Current Outpatient Medications:   •  desonide (DESOWEN) 0 05 % cream, daily as needed, Disp: , Rfl: 0  •  multivitamin (THERAGRAN) TABS, Take 1 tablet by mouth daily, Disp: , Rfl:   •  pantoprazole (PROTONIX) 40 mg tablet, Take 1 tablet (40 mg total) by mouth daily, Disp: 30 tablet, Rfl: 2    Allergies   Allergen Reactions   • Celecoxib Swelling     Redness of ears   • Penicillins Other (See Comments)     Unknown - childhood       Objective:  Vitals:    22 0958   BP: 140/80   Pulse: 80       Body mass index is 26 26 kg/m²      Left Knee Exam     Tenderness   The patient is experiencing no tenderness  Range of Motion   Extension: 0   Flexion: 130     Tests   Jake:  Medial - negative Lateral - negative  Varus: negative Valgus: negative  Lachman:  Anterior - negative      Drawer:  Posterior - negative    Other   Swelling: none  Effusion: no effusion present    Comments:  Mild patellar crepitance with passive motion           Observations   Left Knee   Negative for effusion  Physical Exam  Vitals and nursing note reviewed  Constitutional:       General: She is not in acute distress  Appearance: Normal appearance  HENT:      Head: Normocephalic and atraumatic  Right Ear: External ear normal       Left Ear: External ear normal    Eyes:      General:         Right eye: No discharge  Left eye: No discharge  Conjunctiva/sclera: Conjunctivae normal    Cardiovascular:      Rate and Rhythm: Normal rate  Pulmonary:      Effort: Pulmonary effort is normal  No respiratory distress  Musculoskeletal:      Left knee: No effusion  Instability Tests: Medial Jake test negative and lateral Jake test negative  Comments: See ortho eam   Skin:     General: Skin is warm and dry  Neurological:      Mental Status: She is alert and oriented to person, place, and time  Psychiatric:         Mood and Affect: Mood normal          Behavior: Behavior normal           I have personally reviewed pertinent films in PACS  AP, lateral, and sunrise views of the left knee show no significant degenerative changes or malpositioning of the patella    This document was created using speech voice recognition software  Grammatical errors, random word insertions, pronoun errors, and incomplete sentences are an occasional consequence of this system due to software limitations, ambient noise, and hardware issues     Any formal questions or concerns about content, text, or information contained within the body of this dictation should be directly addressed to the provider for clarification

## 2023-01-09 ENCOUNTER — EVALUATION (OUTPATIENT)
Dept: PHYSICAL THERAPY | Facility: CLINIC | Age: 53
End: 2023-01-09

## 2023-01-09 DIAGNOSIS — M22.42 CHONDROMALACIA PATELLAE OF LEFT KNEE: ICD-10-CM

## 2023-01-09 DIAGNOSIS — M25.562 ACUTE PAIN OF LEFT KNEE: ICD-10-CM

## 2023-01-09 NOTE — PROGRESS NOTES
PT Evaluation   Today's date: 2023  Patient name: Lily Jackson  : 1970  MRN: [id number]  Referring provider: James Lopez DO  Dx:   Encounter Diagnosis     ICD-10-CM    1  Acute pain of left knee  M25 562 Ambulatory Referral to Physical Therapy      2  Chondromalacia patellae of left knee  M22 42 Ambulatory Referral to Physical Therapy            Assessment  Assessment details: Patient is a 48 y o  Female who presents to skilled outpatient PT for left knee pain  Referring diagnoses include acute pain of left knee and chondromalacia patellae of left knee  Patient presents with pain in left knee that is medial and feels like ground glass in knee when kneeling on hard floor on knee  Patient is very concerned about starting new fitness routine and exacerbating sxs  Patient has decreased tissue extensibility on scar tissue under left patella  The aforementioned impairments have limited the patient's ability to kneel on floor to pick things up  No further referral is necessary at this time based upon examination results  Patient education performed during today's session included: importance of not exacerbating sxs  by kneeling on hard ground and the nature of scar tissue    Impairments: Activity intolerance, Impaired physical strength, Lacks appropriate HEP, Poor posture and Pain with function  Understanding of Dx/Px/POC: Excellent  Prognosis: Good    Patient verbalized understanding of POC  Please contact me if you have any questions or recommendations  Thank you for the referral and the opportunity to share in [address] care          Plan  Plan details: see below    Patient would benefit from: Skilled PT  Planned modality interventions: Biofeedback, Cryotherapy, TENS and Thermotherapy: Hydrocollator Packs  Planned therapy interventions: Balance, Body mechanics training, Functional ROM exercises, HEP, Joint mobilization, Manual therapy, Bhakta taping, Neuromuscular re-education, Patient education, Postural training, Strengthening, Stretching, Therapeutic activities and Therapeutic exercises  Frequency: 2x/wk  Duration in weeks: 8  Plan of Care beginning date: 23  Plan of Care expiration date: 8 weeks - 3/6/2023  Treatment plan discussed with: Patient       Goals  Short Term Goals (4 weeks):    - Patient will be independent in basic HEP 2-3 weeks  - Patient will have 0/10 pain at rest  - Patient will demonstrate >1/3 improvement in MMT grade as applicable  - Patient will be able to deep squat with no pain    Long Term Goals (8 weeks):  - Patient will be independent in a comprehensive home exercise program  - Patient FOTO score will improve by 10 points  - Patient will self-report >75% improvement in function  - Patient will be able to kneel on bed with no pain  - Patient will be able to kneel on floor to pick things us with no more than 2/10 pain      Subjective    History of Present Illness  - Mechanism of injury: Pt believes that it happens when she fell in parking lot at work  Opened wound and cleaned it out  Happened before COVID  Not sure exactly when it happened  Hasn't really been kneeling in last decade  Feels pain in center of knee when kneeling on bed      Pain  - Current pain ratin/10  - At best pain ratin/10  - At worst pain ratin/10  - Location: center of right knee  - Aggravating factors: kneeling    Social Support  - Steps to enter house: 3  - Stairs in house: 12   - Bedroom/bathroom set up: 2nd floor  - Lives in: house  - Lives with:       Objective       LE MMT  - L Hip Flexion: 5/5   R Hip Flexion: 5/5  - L Hip Extension: 5/5  R Hip Extension: 5/5  - L Hip Abduction: 5/5  R Hip Abduction: 5/5  - L Hip Adduction: 5/5  R Hip Adduction: 5/5  - L Hip IR: 5/5   R Hip IR: 5/5  - L Hip ER: 5/5   R Hip ER: 5/5  - L Knee Extension: 5/5  R Knee Extension: 5/5  - L Knee Flexion: 5/5  R Knee Flexion: 5/5  - L Ankle DF: 5/5   R Ankle DF: 5/5  - L Ankle PF: 5/5   R Ankle PF: 5/5    LE ROM  All hip and knee ROM WNL  - patella mobility WNL bilaterally    Sensation  - Light touch: intact    Skin Check  - scar under left knee, some residual edema comparet to other knee    Knee Comments  -Unable to replicate sxs in clinic    - scar tissue in left knee under patella likely contributing to pain  - tenderness along left peronials    Lumbar Spine-  10x forward flexion- no change in sxs  10x extension- no change in sxs    Additional-  -b/l Merlin test normal              Insurance:  A/CMS Eval/ Re-eval POC expires Therisa Gallus #/ Referral # Total   Visits  Start date  Expiration date Extension  Visit limitation? PT only or  PT+OT?  Co-Insurance   [facility] 1/9/23 3/6/23  No auth  1/9/23                                                                      AUTH #:  Date               Visits  Authed:  Used                Remaining                     Precautions: standard  Past Medical History:   Diagnosis Date   • Acute non-recurrent maxillary sinusitis 12/30/2021   • DANIELE positive    • COVID-19 10/14/2022   • Eczema    • Elevated liver enzymes 12/30/2021   • GE reflux 12/30/2021   • GERD (gastroesophageal reflux disease)    • Joint pain    • Multiple thyroid nodules    • Psoriasis 12/30/2021   • Rhinitis, allergic    • Vitamin D deficiency 12/30/2021         Date 1/9/2023        Visit Number IE        ROM         Knee Flexion         Knee Extension                  Manual         Patellar mobs         STM Left knee scar tissue- HEP        Roll out peroneals Performed- HEP                                   Neuro Re-ed         Quad set          Glute set         Standing hip ABD/ EXT         Glute reach TRX squats                  TherEx         Knee/Hip PROM         Active w/u         Step ups         SLR         Hip burners         LAQ         Heel slides         SAQ         Leg press TherAct         Patient education         Stair negotiation         Car transfer                           Gait Training                                    Modalities         CP

## 2023-01-12 ENCOUNTER — OFFICE VISIT (OUTPATIENT)
Dept: PHYSICAL THERAPY | Facility: CLINIC | Age: 53
End: 2023-01-12

## 2023-01-12 DIAGNOSIS — M22.42 CHONDROMALACIA PATELLAE OF LEFT KNEE: ICD-10-CM

## 2023-01-12 DIAGNOSIS — M25.562 ACUTE PAIN OF LEFT KNEE: Primary | ICD-10-CM

## 2023-01-12 NOTE — PROGRESS NOTES
Daily Note     Today's date: 2023  Patient name: Timi Regan  : 1970  MRN: [id number]  Referring provider: Kiki Purdy DO  Dx:   Encounter Diagnosis     ICD-10-CM    1  Acute pain of left knee  M25 562       2  Chondromalacia patellae of left knee  M22 42           Start Time: 0800  Stop Time: 0845  Total time in clinic (min): 45 minutes    Subjective: Patient reported no changes since last in clinic      Objective: See treatment diary below      Assessment: Tolerated treatment well  Patient would benefit from continued PT in order to reduce scar tissue, increase tissue extensibilty of peroneals, and length of quads  Patient was able to kneel without pain on airex cushion  Plan: Continue per plan of care               Insurance:  Home/CMS Eval/ Re-eval POC expires Daisy Stanton #/ Referral # Total   Visits  Start date  Expiration date Extension  Visit limitation? PT only or  PT+OT?  Co-Insurance   [facility] 1/9/23 3/6/23   No auth   23                                                                                                                              AUTH #:  Date                           Visits  Authed:  Used                             Remaining                                  Precautions: standard       Past Medical History:   Diagnosis Date   • Acute non-recurrent maxillary sinusitis 2021   • DANIELE positive     • COVID-19 10/14/2022   • Eczema     • Elevated liver enzymes 2021   • GE reflux 2021   • GERD (gastroesophageal reflux disease)     • Joint pain     • Multiple thyroid nodules     • Psoriasis 2021   • Rhinitis, allergic     • Vitamin D deficiency 2021            Date 2023           Visit Number IE  2           ROM               Knee Flexion               Knee Extension                               Manual               Patellar mobs               STM Left knee scar tissue- HEP  left knee scar tissue , graston, left ITB, left peroneal           Roll out peroneals Performed- HEP                                                             Neuro Re-ed               Quad set                Glute set               Standing hip ABD/ EXT    2*10           Glute reach TRX squats    2*10                           TherEx               Knee/Hip PROM               Active w/u    5 min rec bike resistance 3           Step ups               SLR                              LAQ               Heel slides               SAQ               Leg press    85# 3*10            Hip burners    yellow ball 2*10 b/l            foam roll quads, left ITB    performed                                           TherAct               Patient education               Stair negotiation               Car transfer                                               Gait Training                                                               Modalities               CP

## 2023-01-20 ENCOUNTER — OFFICE VISIT (OUTPATIENT)
Dept: PHYSICAL THERAPY | Facility: CLINIC | Age: 53
End: 2023-01-20

## 2023-01-20 DIAGNOSIS — M22.42 CHONDROMALACIA PATELLAE OF LEFT KNEE: ICD-10-CM

## 2023-01-20 DIAGNOSIS — M25.562 ACUTE PAIN OF LEFT KNEE: Primary | ICD-10-CM

## 2023-01-20 NOTE — PROGRESS NOTES
Daily Note     Today's date: 2023  Patient name: Yunior Curtis  : 1970  MRN: [id number]  Referring provider: Fabio Howell DO  Dx:   Encounter Diagnosis     ICD-10-CM    1  Acute pain of left knee  M25 562       2  Chondromalacia patellae of left knee  M22 42           Start Time: 1100  Stop Time: 7467  Total time in clinic (min): 45 minutes    Subjective: Patient recently joined a gym and participated in a couple classes  She notes increased pain of her L knee when she was doing her floor exercises, but noted fatigue post tx  She had to modify her exercises due to the L knee pain  Objective: See treatment diary below      Assessment: Tolerated treatment well  Patient would benefit from continued PT in order to reduce scar tissue, increase tissue extensibility of of quad tendon distal to patella, and increase quad strength  Patient was able to kneel on left knee on airex doing bird dogs for 3 min with no increase in pain after patellar taping  Plan: Continue per plan of care               Insurance:  AMA/CMS Eval/ Re-eval POC expires [facility] Job #/ Referral # Total   Visits  Start date  Expiration date Extension  Visit limitation? PT only or  PT+OT?  Co-Insurance   [facility]  CMS 1/9/23 3/6/23   No auth   23                                                                                                                              AUTH #:  Date                           Visits  Authed:  Used                             Remaining                                  Precautions: standard       Past Medical History:   Diagnosis Date   • Acute non-recurrent maxillary sinusitis 2021   • DANIELE positive     • COVID-19 10/14/2022   • Eczema     • Elevated liver enzymes 2021   • GE reflux 2021   • GERD (gastroesophageal reflux disease)     • Joint pain     • Multiple thyroid nodules     • Psoriasis 2021   • Rhinitis, allergic     • Vitamin D deficiency 2021            Date 1/9/2023 1/12/23 1/20/23         Visit Number IE  2  3         ROM               Knee Flexion               Knee Extension                               Manual               Patellar mobs               STM Left knee scar tissue- HEP  left knee scar tissue , graston, left ITB, left peroneal  graston left patella tendon         Roll out peroneals Performed- HEP    patella taping                                                         Neuro Re-ed               Quad set                Glute set               Standing hip ABD/ EXT    2*10  2*10 BTB         Glute reach TRX squats    2*10            bird dog    Left knee on airex 3 min         TherEx               Knee/Hip PROM               Active w/u    5 min rec bike resistance 3           Step ups      8" 2*10         SLR      lateral heel touch down from 6" step 1*10         squat      with red theraball 2*10         LAQ               Heel slides               SAQ               Leg press    85# 3*10            Hip burners    yellow ball 2*10 b/l            foam roll quads, left ITB    performed                                           TherAct               Patient education               Stair negotiation               Car transfer                                               Gait Training                                                               Modalities               CP

## 2023-01-23 ENCOUNTER — OFFICE VISIT (OUTPATIENT)
Dept: PHYSICAL THERAPY | Facility: CLINIC | Age: 53
End: 2023-01-23

## 2023-01-23 DIAGNOSIS — M22.42 CHONDROMALACIA PATELLAE OF LEFT KNEE: ICD-10-CM

## 2023-01-23 DIAGNOSIS — M25.562 ACUTE PAIN OF LEFT KNEE: Primary | ICD-10-CM

## 2023-01-23 NOTE — PROGRESS NOTES
Daily Note     Today's date: 2023  Patient name: Kathryn Pitt  : 1970  MRN: [id number]  Referring provider: Gin Rodrigues DO  Dx:   Encounter Diagnosis     ICD-10-CM    1  Acute pain of left knee  M25 562       2  Chondromalacia patellae of left knee  M22 42           Start Time: 7647  Stop Time: 1500  Total time in clinic (min): 45 minutes    Subjective: Patient reported being very sore muscularly due to new workout schedule      Objective: See treatment diary below      Assessment: Tolerated treatment well  Patient would benefit from continued PT in order to build quad, hip, and glute strength to support knee  Continue to break up scar tissue on knee and stretch patella tendon  Plan: Progress treatment as tolerated               Insurance:  A/CMS Eval/ Re-eval POC expires Jerry Loge #/ Referral # Total   Visits  Start date  Expiration date Extension  Visit limitation? PT only or  PT+OT?  Co-Insurance   [facility] 1/9/23 3/6/23   No auth   23                                                                                                                              AUTH #:  Date                           Visits  Authed:  Used                             Remaining                                  Precautions: standard       Past Medical History:   Diagnosis Date   • Acute non-recurrent maxillary sinusitis 2021   • DANIELE positive     • COVID-19 10/14/2022   • Eczema     • Elevated liver enzymes 2021   • GE reflux 2021   • GERD (gastroesophageal reflux disease)     • Joint pain     • Multiple thyroid nodules     • Psoriasis 2021   • Rhinitis, allergic     • Vitamin D deficiency 2021            Date 2023       Visit Number IE  2  3  4       ROM               Knee Flexion               Knee Extension                               Manual               Patellar mobs               STM Left knee scar tissue- HEP  left knee scar tissue , graston, left ITB, left peroneal  graston left patella tendon  graston, left patella tendon       Roll out peroneals Performed- HEP    patella taping  patella taping                                                       Neuro Re-ed               Quad set                Glute set               Standing hip ABD/ EXT    2*10  2*10 BTB  2*10 BTB       Glute reach TRX squats    2*10           clamshell    2*10 BTB     bridge    2*10 ADD squeeze         TRX slow, low squat 2*10      bird dog    Left knee on airex 3 min  left lunge with CC w row 7 5# 2*10       TherEx               Knee/Hip PROM               Active w/u    5 min rec bike resistance 3           Step ups      8" 2*10  8" 2*10       SLR      lateral heel touch down from 6" step 1*10  lateral heel touch down from 6" step 2*10       squat      with red theraball 2*10         LAQ               Heel slides               SAQ               Leg press    85# 3*10            Hip burners    yellow ball 2*10 b/l            foam roll quads, left ITB    performed            arch lifts       2*10        single toe touch down       2*10       TherAct               Patient education               Stair negotiation               Car transfer                                               Gait Training                                                               Modalities               CP

## 2023-01-27 ENCOUNTER — APPOINTMENT (OUTPATIENT)
Dept: PHYSICAL THERAPY | Facility: CLINIC | Age: 53
End: 2023-01-27

## 2023-01-30 ENCOUNTER — OFFICE VISIT (OUTPATIENT)
Dept: PHYSICAL THERAPY | Facility: CLINIC | Age: 53
End: 2023-01-30

## 2023-01-30 DIAGNOSIS — M25.562 ACUTE PAIN OF LEFT KNEE: Primary | ICD-10-CM

## 2023-01-30 DIAGNOSIS — M22.42 CHONDROMALACIA PATELLAE OF LEFT KNEE: ICD-10-CM

## 2023-01-30 NOTE — PROGRESS NOTES
Daily Note     Today's date: 2023  Patient name: Trish Peoples  : 1970  MRN: [id number]  Referring provider: Peg Joyner DO  Dx:   Encounter Diagnosis     ICD-10-CM    1  Acute pain of left knee  M25 562       2  Chondromalacia patellae of left knee  M22 42           Start Time: 0930  Stop Time: 1015  Total time in clinic (min): 45 minutes    Subjective: Patient reports no changes since last session- knee feels stable and she hasn't tripped    Objective: See treatment diary below      Assessment: Tolerated treatment well  Patient would benefit from continued PT in order to build quads, hips and  glutes to support knee  Continue to increase tissue extensibility through scar tissue mobilization  Was fatigued by end of session  Plan: Continue per plan of care              Insurance:  A/CMS Eval/ Re-eval POC expires Ellyn Washington #/ Referral # Total   Visits  Start date  Expiration date Extension  Visit limitation? PT only or  PT+OT?  Co-Insurance   [facility] 1/9/23 3/6/23   No auth   23                                                                                                                              AUTH #:  Date                           Visits  Authed:  Used                             Remaining                                  Precautions: standard       Past Medical History:   Diagnosis Date   • Acute non-recurrent maxillary sinusitis 2021   • DANIELE positive     • COVID-19 10/14/2022   • Eczema     • Elevated liver enzymes 2021   • GE reflux 2021   • GERD (gastroesophageal reflux disease)     • Joint pain     • Multiple thyroid nodules     • Psoriasis 2021   • Rhinitis, allergic     • Vitamin D deficiency 2021            Date 2023     Visit Number IE  2  3  4  5- FOTO     ROM               Knee Flexion               Knee Extension                               Manual               Patellar mobs               STM Left knee scar tissue- HEP  left knee scar tissue , graston, left ITB, left peroneal  graston left patella tendon  graston, left patella tendon  graston, left patella tendon     Roll out peroneals Performed- HEP    patella taping  patella taping                                                       Neuro Re-ed               Quad set                Glute set               Standing hip ABD/ EXT    2*10  2*10 BTB  2*10 BTB  2*10 BTB     Glute reach TRX squats    2*10      2*10     TRX lunge witih drive     8*68 b/l    Lateral walks      15' x 4 laps BTB             clamshell    2*10 BTB     bridge    2*10 ADD squeeze         TRX slow, low squat 2*10      bird dog    Left knee on airex 3 min  left lunge with CC w row 7 5# 2*10       TherEx               Knee/Hip PROM               Active w/u    5 min rec bike resistance 3           Step ups      8" 2*10  8" 2*10  8" 2*10     SLR      lateral heel touch down from 6" step 1*10  lateral heel touch down from 6" step 2*10  lateral heel touch down from 6" step 2*10     squat      with red theraball 2*10         LAQ          5# 20x     Heel slides         Leg press 85# 3*10     SAQ               Leg press    85# 3*10            Hip burners    yellow ball 2*10 b/l            foam roll quads, left ITB    performed            arch lifts       2*10        single toe touch down       2*10       TherAct               Patient education               Stair negotiation               Car transfer                                               Gait Training                                                               Modalities               CP

## 2023-08-06 ENCOUNTER — OFFICE VISIT (OUTPATIENT)
Dept: URGENT CARE | Facility: CLINIC | Age: 53
End: 2023-08-06
Payer: COMMERCIAL

## 2023-08-06 VITALS
HEART RATE: 77 BPM | RESPIRATION RATE: 18 BRPM | TEMPERATURE: 98.7 F | WEIGHT: 149 LBS | SYSTOLIC BLOOD PRESSURE: 130 MMHG | OXYGEN SATURATION: 99 % | DIASTOLIC BLOOD PRESSURE: 70 MMHG | HEIGHT: 62 IN | BODY MASS INDEX: 27.42 KG/M2

## 2023-08-06 DIAGNOSIS — S76.311A STRAIN OF RIGHT HAMSTRING, INITIAL ENCOUNTER: Primary | ICD-10-CM

## 2023-08-06 PROCEDURE — 99213 OFFICE O/P EST LOW 20 MIN: CPT | Performed by: PHYSICIAN ASSISTANT

## 2023-08-06 RX ORDER — CYCLOBENZAPRINE HCL 10 MG
10 TABLET ORAL
Qty: 14 TABLET | Refills: 0 | Status: SHIPPED | OUTPATIENT
Start: 2023-08-06

## 2023-08-06 NOTE — PROGRESS NOTES
[facility]        NAME: Matthew Delgadillo is a 48 y.o. female  : 1970    MRN: [id number]  DATE: 2023  TIME: 9:43 AM    Assessment and Plan   Strain of right hamstring, initial encounter [S76.311A]  1. Strain of right hamstring, initial encounter  Ambulatory Referral to Sports Medicine    cyclobenzaprine (FLEXERIL) 10 mg tablet            Patient Instructions     Patient Instructions   Signs and symptoms consistent with muscular strain/sprain. Recommend continuing to ice the area, application of topical muscle rubs and gentle stretching. Provided referral to sports medicine for further evaluation and management if symptoms are not improving or resolving over the next few days. All patient's questions answered and is agreeable with this plan. Follow up with PCP in 3-5 days. Proceed to  ER if symptoms worsen. Chief Complaint     Chief Complaint   Patient presents with   • Leg Pain     Right leg pain from a fall occurred yesterday. History of Present Illness       Patient is a 61-year-old female presenting today with right leg pain x1 day. Patient notes yesterday while mopping in her home she slipped and lost her balance, notes that her right leg shot out in front of her and fell to the ground in a split like manner, notes that she immediately felt pain and discomfort on the back of her right thigh up to her buttock, was able to get up and ambulate following the incident but is experiencing pain and discomfort of this area, has been applying ice and took some Tylenol which has helped slightly. Denies trauma or injury to any other areas, denies head strike or LOC. Denies bruising or swelling, numbness, tingling, inability to bear weight. Review of Systems   Review of Systems   Constitutional: Negative for chills and fever. HENT: Negative for ear pain and sore throat. Eyes: Negative for pain and visual disturbance.    Respiratory: Negative for cough and shortness of breath. Cardiovascular: Negative for chest pain and palpitations. Gastrointestinal: Negative for abdominal pain and vomiting. Genitourinary: Negative for dysuria and hematuria. Musculoskeletal:        See HPI   Skin: Negative for color change and rash. Neurological: Negative for seizures and syncope. All other systems reviewed and are negative.         Current Medications       Current Outpatient Medications:   •  cyclobenzaprine (FLEXERIL) 10 mg tablet, Take 1 tablet (10 mg total) by mouth daily at bedtime, Disp: 14 tablet, Rfl: 0  •  desonide (DESOWEN) 0.05 % cream, daily as needed, Disp: , Rfl: 0  •  multivitamin (THERAGRAN) TABS, Take 1 tablet by mouth daily, Disp: , Rfl:   •  pantoprazole (PROTONIX) 40 mg tablet, Take 1 tablet (40 mg total) by mouth daily, Disp: 30 tablet, Rfl: 2    Current Allergies     Allergies as of 08/06/2023 - Reviewed 08/06/2023   Allergen Reaction Noted   • Celecoxib Swelling 02/16/2016   • Penicillins Other (See Comments) 02/16/2016            The following portions of the patient's history were reviewed and updated as appropriate: allergies, current medications, past family history, past medical history, past social history, past surgical history and problem list.     Past Medical History:   Diagnosis Date   • Acute non-recurrent maxillary sinusitis 12/30/2021   • DANIELE positive    • COVID-19 10/14/2022   • Eczema    • Elevated liver enzymes 12/30/2021   • GE reflux 12/30/2021   • GERD (gastroesophageal reflux disease)    • Joint pain    • Multiple thyroid nodules    • Psoriasis 12/30/2021   • Rhinitis, allergic    • Vitamin D deficiency 12/30/2021       Past Surgical History:   Procedure Laterality Date   • ANAL FISSURECTOMY     • MAMMO (HISTORICAL) Bilateral    • SHOULDER SURGERY Left        Family History   Problem Relation Age of Onset   • No Known Problems Mother    • Eczema Father    • COPD Father    • No Known Problems Sister    • No Known Problems Brother    • No Known Problems Maternal Grandmother    • No Known Problems Maternal Grandfather    • Psoriasis Paternal Grandmother    • No Known Problems Paternal Grandfather    • No Known Problems Maternal Aunt    • No Known Problems Maternal Uncle    • No Known Problems Paternal Aunt    • No Known Problems Paternal Uncle          Medications have been verified. Objective   /70   Pulse 77   Temp 98.7 °F (37.1 °C)   Resp 18   Ht 5' 2" (1.575 m)   Wt 67.6 kg (149 lb)   SpO2 99%   BMI 27.25 kg/m²        Physical Exam     Physical Exam  Vitals and nursing note reviewed. Constitutional:       General: She is not in acute distress. Appearance: Normal appearance. Cardiovascular:      Rate and Rhythm: Normal rate. Pulses: Normal pulses. Pulmonary:      Effort: Pulmonary effort is normal.   Musculoskeletal:      Comments: No bruising or swelling noted on posterior aspect of right thigh in line with hamstrings, moderate TTP along entirety of hamstrings, pain elicited through flexion and extension of right lower extremity and when engaging hamstrings, normal strength of lower extremities bilaterally, gross sensation of lower extremities intact, 2+ distal pulses. Skin:     General: Skin is warm. Capillary Refill: Capillary refill takes less than 2 seconds. Neurological:      Mental Status: She is alert.

## 2023-08-06 NOTE — PATIENT INSTRUCTIONS
Signs and symptoms consistent with muscular strain/sprain. Recommend continuing to ice the area, application of topical muscle rubs and gentle stretching. Provided referral to sports medicine for further evaluation and management if symptoms are not improving or resolving over the next few days. All patient's questions answered and is agreeable with this plan.

## 2023-08-08 ENCOUNTER — APPOINTMENT (OUTPATIENT)
Dept: RADIOLOGY | Facility: CLINIC | Age: 53
End: 2023-08-08
Payer: COMMERCIAL

## 2023-08-08 ENCOUNTER — OFFICE VISIT (OUTPATIENT)
Dept: OBGYN CLINIC | Facility: CLINIC | Age: 53
End: 2023-08-08
Payer: COMMERCIAL

## 2023-08-08 VITALS
SYSTOLIC BLOOD PRESSURE: 128 MMHG | HEART RATE: 92 BPM | BODY MASS INDEX: 27.79 KG/M2 | DIASTOLIC BLOOD PRESSURE: 78 MMHG | WEIGHT: 151 LBS | HEIGHT: 62 IN

## 2023-08-08 DIAGNOSIS — S76.311A STRAIN OF RIGHT HAMSTRING, INITIAL ENCOUNTER: ICD-10-CM

## 2023-08-08 PROCEDURE — 73502 X-RAY EXAM HIP UNI 2-3 VIEWS: CPT

## 2023-08-08 PROCEDURE — 99214 OFFICE O/P EST MOD 30 MIN: CPT | Performed by: PHYSICIAN ASSISTANT

## 2023-08-08 RX ORDER — CALCIPOTRIENE AND BETAMETHASONE DIPROPIONATE 50; .5 UG/G; MG/G
AEROSOL, FOAM TOPICAL
COMMUNITY
Start: 2023-05-05

## 2023-08-08 RX ORDER — CALCIPOTRIENE 50 UG/G
CREAM TOPICAL
COMMUNITY
Start: 2023-05-04

## 2023-08-08 RX ORDER — FLUOCINOLONE ACETONIDE 0.25 MG/G
OINTMENT TOPICAL
COMMUNITY
Start: 2023-05-04

## 2023-08-08 RX ORDER — BETAMETHASONE DIPROPIONATE 0.5 MG/G
LOTION TOPICAL
COMMUNITY
Start: 2023-05-10

## 2023-08-08 NOTE — PROGRESS NOTES
Assessment/Plan:  1. Strain of right hamstring, initial encounter  Ambulatory Referral to Sports Medicine    XR hip/pelv 2-3 vws right if performed        Given the patient;s traumatic injury as well as her significant weakness with knee flexion, I do have concern for possible proximal hamstring tendon rupture. I am ordering a STAT MRI to rule this out. If complete rupture is found, she will be referred to one of our sports surgeons. If partial tendon tear or muscle belly tear, we discussed physical therapy. She will FU after her MRI to discuss the results and how to proceed. Subjective:   Noam Brewer is a 48 y.o. female who presents today for evaluation of her right lower extremity. She sustained a fall on a wet tile floor on Saturday, causing her leg to go out straight in front of her forcefully. She has had posterior thigh and buttock pain since that time. She notes she is ambulating with a limp and notes difficulty with knee flexion. She denies any ecchymosis about the leg and notes good sensation of the right lower extremity. Review of Systems   Constitutional: Negative. Negative for chills and fever. HENT: Negative. Negative for ear pain and sore throat. Eyes: Negative. Negative for pain and redness. Respiratory: Negative. Negative for shortness of breath and wheezing. Cardiovascular: Negative for chest pain and palpitations. Gastrointestinal: Negative. Negative for abdominal pain and blood in stool. Endocrine: Negative. Negative for polydipsia and polyuria. Genitourinary: Negative. Negative for difficulty urinating and dysuria. Musculoskeletal:        As noted in HPI   Skin: Negative. Negative for pallor and rash. Neurological: Negative. Negative for dizziness and numbness. Hematological: Negative. Negative for adenopathy. Does not bruise/bleed easily. Psychiatric/Behavioral: Negative. Negative for confusion and suicidal ideas.          Past Medical History: Diagnosis Date   • Acute non-recurrent maxillary sinusitis 2021   • DANIELE positive    • COVID-19 10/14/2022   • Eczema    • Elevated liver enzymes 2021   • GE reflux 2021   • GERD (gastroesophageal reflux disease)    • Joint pain    • Multiple thyroid nodules    • Psoriasis 2021   • Rhinitis, allergic    • Vitamin D deficiency 2021       Past Surgical History:   Procedure Laterality Date   • ANAL FISSURECTOMY     • MAMMO (HISTORICAL) Bilateral    • SHOULDER SURGERY Left        Family History   Problem Relation Age of Onset   • No Known Problems Mother    • Eczema Father    • COPD Father    • No Known Problems Sister    • No Known Problems Brother    • No Known Problems Maternal Grandmother    • No Known Problems Maternal Grandfather    • Psoriasis Paternal Grandmother    • No Known Problems Paternal Grandfather    • No Known Problems Maternal Aunt    • No Known Problems Maternal Uncle    • No Known Problems Paternal Aunt    • No Known Problems Paternal Uncle        Social History     Occupational History   • Not on file   Tobacco Use   • Smoking status: Former     Types: Cigarettes     Quit date: 2004     Years since quittin.1   • Smokeless tobacco: Never   Vaping Use   • Vaping Use: Never used   Substance and Sexual Activity   • Alcohol use:  Yes     Alcohol/week: 4.0 standard drinks of alcohol     Types: 4 Glasses of wine per week     Comment: few times a week    • Drug use: Never   • Sexual activity: Yes         Current Outpatient Medications:   •  betamethasone dipropionate 0.05 % lotion, APPLY 1 TOPICALLY 2 TIMES A DAY TO PSORIAISS ON SCALP X 2 WEEKS /AS NEEDED, Disp: , Rfl:   •  calcipotriene (DOVONEX) 0.005 % cream, APPLY 1 TOPICALLY TWICE A DAY TO PSORIASIS PLAQUES WHEN NOT USING STEROID OINT, Disp: , Rfl:   •  desonide (DESOWEN) 0.05 % cream, daily as needed, Disp: , Rfl: 0  •  Enstilar 0.005-0.064 % FOAM, APPLY TOPICALLY EVERY DAY TO PSORIASIS PLAQUES ON TRUNK, Disp: , Rfl:   •  fluocinolone (SYNALAR) 0.025 % ointment, APPLY 1 TOPICALLY TWICE A DAY TO PSORIASIS ON FACE X 1 WEEK / AS NEEDED FOR FLARES, Disp: , Rfl:   •  multivitamin (THERAGRAN) TABS, Take 1 tablet by mouth daily, Disp: , Rfl:   •  pantoprazole (PROTONIX) 40 mg tablet, Take 1 tablet (40 mg total) by mouth daily, Disp: 30 tablet, Rfl: 2  •  cyclobenzaprine (FLEXERIL) 10 mg tablet, Take 1 tablet (10 mg total) by mouth daily at bedtime (Patient not taking: Reported on 8/8/2023), Disp: 14 tablet, Rfl: 0    Allergies   Allergen Reactions   • Celecoxib Swelling     Redness of ears   • Penicillins Other (See Comments)     Unknown - childhood       Objective:  Vitals:    08/08/23 0858   BP: 128/78   Pulse: 92            Right Hip Exam     Tenderness   Right hip tenderness location: No tenderness ischial tuberosity. Range of Motion   Flexion: 90     Other   Erythema: absent  Sensation: normal  Pulse: present    Comments:  No ecchymosis posterior thigh. No palpable hamstring defect appreciated. 3/5 strength knee flexion with significant discomfort. Physical Exam  Constitutional:       General: She is not in acute distress. Appearance: She is well-developed. HENT:      Head: Normocephalic and atraumatic. Eyes:      General: No scleral icterus. Conjunctiva/sclera: Conjunctivae normal.   Neck:      Vascular: No JVD. Cardiovascular:      Rate and Rhythm: Normal rate. Pulmonary:      Effort: Pulmonary effort is normal. No respiratory distress. Skin:     General: Skin is warm. Neurological:      Mental Status: She is alert and oriented to person, place, and time. Coordination: Coordination normal.         I have personally reviewed pertinent films in PACS and my interpretation is as follows:  Xrays right hip/pelvis: No acute osseous abnormality. This document was created using speech voice recognition software.    Grammatical errors, random word insertions, pronoun errors, and incomplete sentences are an occasional consequence of this system due to software limitations, ambient noise, and hardware issues. Any formal questions or concerns about content, text, or information contained within the body of this dictation should be directly addressed to the provider for clarification.

## 2023-08-09 ENCOUNTER — HOSPITAL ENCOUNTER (OUTPATIENT)
Dept: RADIOLOGY | Facility: HOSPITAL | Age: 53
Discharge: HOME | End: 2023-08-09
Payer: COMMERCIAL

## 2023-08-09 DIAGNOSIS — S76.311A STRAIN OF RIGHT HAMSTRING, INITIAL ENCOUNTER: ICD-10-CM

## 2023-08-09 PROCEDURE — G1004 CDSM NDSC: HCPCS

## 2023-08-09 PROCEDURE — A9585 GADOBUTROL INJECTION: HCPCS | Performed by: PHYSICIAN ASSISTANT

## 2023-08-09 PROCEDURE — 72197 MRI PELVIS W/O & W/DYE: CPT

## 2023-08-09 RX ORDER — GADOBUTROL 604.72 MG/ML
6 INJECTION INTRAVENOUS
Status: CANCELLED | OUTPATIENT
Start: 2023-08-09

## 2023-08-09 RX ORDER — GADOBUTROL 604.72 MG/ML
6 INJECTION INTRAVENOUS
Status: COMPLETED | OUTPATIENT
Start: 2023-08-09 | End: 2023-08-09

## 2023-08-09 RX ADMIN — GADOBUTROL 6 ML: 604.72 INJECTION INTRAVENOUS at 20:01

## 2023-08-10 ENCOUNTER — TELEPHONE (OUTPATIENT)
Dept: OBGYN CLINIC | Facility: CLINIC | Age: 53
End: 2023-08-10

## 2023-08-10 NOTE — TELEPHONE ENCOUNTER
I was able to speak to patient and advise of below. Patient is asking what can she do to help her comfort level, and what should she be doing and not be doing until she can see the physician. Practice Admin is working on getting her an appointment.

## 2023-08-10 NOTE — TELEPHONE ENCOUNTER
Caller: Finn Olivier    Doctor:     Reason for call: See previous message    Patient called for update on an appointment !!       Patient also asking the best place to ice to help with pain ?      Please advise   Call back#: [phone]

## 2023-08-10 NOTE — TELEPHONE ENCOUNTER
Caller: Patient     Doctor/Office: Filomena Harada     Call regarding :  Returning call      Call was transferred to: Sabinejustin

## 2023-08-10 NOTE — TELEPHONE ENCOUNTER
----- Message from Tres Vegas PA-C sent at 8/10/2023  8:51 AM EDT -----  Patient has 2 of her 3 proximal hamstring tendons torn off of the pelvis. These can usually be treated non-operatively if all 3 are not torn off, however given her significant weakness in knee flexion on examination, I would like the patient to FU with Dr. Andrey Meneses for this. Could we please have her see him asap. Either this week or early  next week. Please message me back confirming appointment is scheduled.  Thank you.   ----- Message -----  From: Interface, Radiology Results In  Sent: 8/10/2023   8:46 AM EDT  To: Tres Vegas PA-C

## 2023-08-10 NOTE — TELEPHONE ENCOUNTER
I left message for patient to call back concerning below. Bin Goncalves has nothing till 8/28 so I will send an email to see if we can get assistance in helping this patient get in ASAP.

## 2023-08-11 NOTE — TELEPHONE ENCOUNTER
Caller: Patient     Doctor: Claudetta Dally     Reason for call: Patient called today following up about who should she see.      Call back#: [phone]

## 2023-08-14 NOTE — TELEPHONE ENCOUNTER
[facility]  did contact this patient for force on. Patient is scheduled with Dr. Tulio Rogers 08/18, she did decline apt offered today.

## 2023-08-18 ENCOUNTER — OFFICE VISIT (OUTPATIENT)
Dept: OBGYN CLINIC | Facility: MEDICAL CENTER | Age: 53
End: 2023-08-18
Payer: COMMERCIAL

## 2023-08-18 VITALS
HEART RATE: 88 BPM | HEIGHT: 62 IN | DIASTOLIC BLOOD PRESSURE: 80 MMHG | WEIGHT: 151 LBS | BODY MASS INDEX: 27.79 KG/M2 | SYSTOLIC BLOOD PRESSURE: 123 MMHG

## 2023-08-18 DIAGNOSIS — S76.311A PARTIAL HAMSTRING TEAR, RIGHT, INITIAL ENCOUNTER: Primary | ICD-10-CM

## 2023-08-18 PROCEDURE — 99214 OFFICE O/P EST MOD 30 MIN: CPT | Performed by: ORTHOPAEDIC SURGERY

## 2023-08-18 RX ORDER — IBUPROFEN 100 MG/1
100 TABLET, CHEWABLE ORAL EVERY 8 HOURS PRN
COMMUNITY

## 2023-08-18 NOTE — PROGRESS NOTES
Orthopaedic Surgery - Office Note  Verner Rast (50 y.o. female)   : 1970   MRN: [id number]  Encounter Date: 2023    Chief Complaint   Patient presents with   • Right Thigh - Pain       Assessment / Plan  Right proximal hamstring tear    · We discussed that based on physical exam and imaging, I do think she will do well with non-surgical treatment. Her hamstring retraction is only 1.7 cm and involves only 2 tendons which does give her high likelihood of returning to prior function  · Referral given to begin PT to work on ROM and strengthening, progress as tolerated   · Ice, heat and anti-inflammatories prn   · Reviewed MRI during today's visit  · [facility]  Return in about 6 weeks (around 2023) for follow up with Dr. Sal Evans. History of Present Illness  Verner Rast is a 48 y.o. female who presents for evaluation of right proximal hamstring tear. On 2023, she slipped on a wet spot in her kitchen causing her right leg to suddenly extend infront of her landing in split. She was seen by SURINDER Ruiz, who was concerned about a hamstring tendon rupture. She states the injury, she has had some increase in ROM. She describes her pain as being in the muscle belly, she does have increase pain with steps however, this has gradually improved since the injury. She does enjoy being active doing things like dance. She would like to return to this level of activity. She denies any radiating symptoms and prior injury. Review of Systems  Pertinent items are noted in HPI. All other systems were reviewed and are negative. Physical Exam  /80   Pulse 88   Ht 5' 2" (1.575 m)   Wt 68.5 kg (151 lb)   BMI 27.62 kg/m²   Cons: Appears well. No apparent distress. Psych: Alert. Oriented x3. Mood and affect normal.  Eyes: PERRLA, EOMI  Resp: Normal effort. No audible wheezing or stridor. CV: Palpable pulse. No discernable arrhythmia. No LE edema.   Lymph: No palpable cervical, axillary, or inguinal lymphadenopathy. Skin: Warm. No palpable masses. No visible lesions. Neuro: Normal muscle tone. Normal and symmetric DTR's. Right Hip Exam  Alignment / Posture:  Normal resting hip posture. Inspection:  No swelling. No erythema. moderate posterior thigh ecchymosis. Palpation:  mild hamstring muscle belly tenderness tenderness. popliteal angle 35 compared to 45 on left leg. No defect felt. No proximal hamstring tendon tenderness   ROM:  Normal knee ROM. Strength:  Hamstrings 4+/5. Stability:  Not tested. Tests:  not tested  Neurovascular:  Sensation intact in DP/SP/Curran/Sa/T nerve distributions. 2+ DP & PT pulses. Gait:  Steady. Studies Reviewed  I have personally reviewed pertinent films in PACS. MRI of pelvis- images from 08/09/2023 showing complete tear of biceps femoris and semitendinosus proximally, with 1.7 cm of retraction      Procedures  No procedures today. Medical, Surgical, Family, and Social History  The patient's medical history, family history, and social history, were reviewed and updated as appropriate.     Past Medical History:   Diagnosis Date   • Acute non-recurrent maxillary sinusitis 12/30/2021   • DANIELE positive    • COVID-19 10/14/2022   • Eczema    • Elevated liver enzymes 12/30/2021   • GE reflux 12/30/2021   • GERD (gastroesophageal reflux disease)    • Joint pain    • Multiple thyroid nodules    • Psoriasis 12/30/2021   • Rhinitis, allergic    • Vitamin D deficiency 12/30/2021       Past Surgical History:   Procedure Laterality Date   • ANAL FISSURECTOMY     • MAMMO (HISTORICAL) Bilateral    • SHOULDER SURGERY Left        Family History   Problem Relation Age of Onset   • No Known Problems Mother    • Eczema Father    • COPD Father    • No Known Problems Sister    • No Known Problems Brother    • No Known Problems Maternal Grandmother    • No Known Problems Maternal Grandfather    • Psoriasis Paternal Grandmother    • No Known Problems Paternal Grandfather    • No Known Problems Maternal Aunt    • No Known Problems Maternal Uncle    • No Known Problems Paternal Aunt    • No Known Problems Paternal Uncle        Social History     Occupational History   • Not on file   Tobacco Use   • Smoking status: Former     Types: Cigarettes     Quit date: 2004     Years since quittin.1   • Smokeless tobacco: Never   Vaping Use   • Vaping Use: Never used   Substance and Sexual Activity   • Alcohol use:  Yes     Alcohol/week: 4.0 standard drinks of alcohol     Types: 4 Glasses of wine per week     Comment: few times a week    • Drug use: Never   • Sexual activity: Yes       Allergies   Allergen Reactions   • Celecoxib Swelling     Redness of ears   • Penicillins Other (See Comments)     Unknown - childhood         Current Outpatient Medications:   •  betamethasone dipropionate 0.05 % lotion, APPLY 1 TOPICALLY 2 TIMES A DAY TO PSORIAISS ON SCALP X 2 WEEKS /AS NEEDED, Disp: , Rfl:   •  calcipotriene (DOVONEX) 0.005 % cream, APPLY 1 TOPICALLY TWICE A DAY TO PSORIASIS PLAQUES WHEN NOT USING STEROID OINT, Disp: , Rfl:   •  desonide (DESOWEN) 0.05 % cream, daily as needed, Disp: , Rfl: 0  •  Enstilar 0.005-0.064 % FOAM, APPLY TOPICALLY EVERY DAY TO PSORIASIS PLAQUES ON TRUNK, Disp: , Rfl:   •  fluocinolone (SYNALAR) 0.025 % ointment, APPLY 1 TOPICALLY TWICE A DAY TO PSORIASIS ON FACE X 1 WEEK / AS NEEDED FOR FLARES, Disp: , Rfl:   •  ibuprofen (ADVIL,MOTRIN) 100 MG chewable tablet, Chew 100 mg every 8 (eight) hours as needed for mild pain, Disp: , Rfl:   •  pantoprazole (PROTONIX) 40 mg tablet, Take 1 tablet (40 mg total) by mouth daily, Disp: 30 tablet, Rfl: 2  •  cyclobenzaprine (FLEXERIL) 10 mg tablet, Take 1 tablet (10 mg total) by mouth daily at bedtime (Patient not taking: Reported on 2023), Disp: 14 tablet, Rfl: 0  •  multivitamin (THERAGRAN) TABS, Take 1 tablet by mouth daily, Disp: , Rfl:       [employer]    I,: Ayesha Carmen am acting as a scribe while in the presence of the attending physician.:       I,:  Nikita Ramirez MD personally performed the services described in this documentation    as scribed in my presence.:

## 2023-08-23 ENCOUNTER — EVALUATION (OUTPATIENT)
Dept: PHYSICAL THERAPY | Facility: CLINIC | Age: 53
End: 2023-08-23
Payer: COMMERCIAL

## 2023-08-23 DIAGNOSIS — M25.551 ACUTE HIP PAIN, RIGHT: ICD-10-CM

## 2023-08-23 DIAGNOSIS — S76.311D PARTIAL HAMSTRING TEAR, RIGHT, SUBSEQUENT ENCOUNTER: Primary | ICD-10-CM

## 2023-08-23 DIAGNOSIS — R26.9 IMPAIRED GAIT: ICD-10-CM

## 2023-08-23 DIAGNOSIS — Z74.09 IMPAIRED FUNCTIONAL MOBILITY, BALANCE, AND ENDURANCE: ICD-10-CM

## 2023-08-23 PROCEDURE — 97161 PT EVAL LOW COMPLEX 20 MIN: CPT | Performed by: PHYSICAL THERAPIST

## 2023-08-23 NOTE — PROGRESS NOTES
PT Evaluation     Today's date: 2023  Patient name: Rajiv Steinberg  : 1970  MRN: [id number]  Referring provider: Dipika Ling, *  Dx:   Encounter Diagnosis     ICD-10-CM    1. Partial hamstring tear, right, subsequent encounter  S76.311D Ambulatory Referral to Physical Therapy      2. Acute hip pain, right  M25.551       3. Impaired gait  R26.9       4. Impaired functional mobility, balance, and endurance  Z74.09           Start Time: 0800  Stop Time: 0859  Total time in clinic (min): 59 minutes    Assessment  Assessment details: Rajiv Steinberg is a 48 y.o. female presenting to PT with chief complaint of acute right leg pain following a slip onto an extended leg. Key findings from the physical exam include antalgic gait, impaired right hip mobility, hip weakness, apparent bruising on the posterior thigh, and pain with function. The patient's presentation is consistent with the referring diagnosis of partial right hamstring tear. The patient's current condition results in the below impairments and functional impairments, most notably impeding her ability to have an exercise routine, perform ADLs/iADLs, and walk around the community. The patient would benefit from skilled physical therapy to restore hip ROM, strength, gait mechanics, and restore functional strength to PLOF. The patient was educated on current diagnosis, prognosis, HEP, and POC.      Impairments: abnormal gait, abnormal or restricted ROM, activity intolerance, impaired balance, impaired physical strength, lacks appropriate home exercise program, pain with function, weight-bearing intolerance and poor body mechanics  Functional limitations: difficulty with stairs; difficulty getting in and out of a car; difficulty bending and lifting; difficulty with sitting up from a chair; inability to dance; inability to run; difficulty walking   Symptom irritability: highUnderstanding of Dx/Px/POC: good   Prognosis: good    Goals  ST-4 weeks  1. The patient will demonstrate independence with HEP in order to improve patient outcomes between sessions. 2. The patient will demonstrate improved hip flexion ROM by >10 degrees to improve getting in and out of car. 3. The patient will be able to walk for . 5 mile with pain <3/10 and body mechanics in order to promote ability to run errands. 4. The patient will be able to perform a STS 5x with pain <3/10 in order to improve ability to sit at desk for work. 5. The patient will be able to ascend/descend a flight of stairs with pain <2/10 in order to promote ability to navigate home. LT-6 weeks   1. The patient will demonstrate improved hip strength in all deficit planes by >1 MMT score. 2. The patient will demonstrate improved hip flexion ROM by >20 degrees to restore ability to dance and exercise. 3. The patient will be able to walk >15 minutes with pain <2/10 and proper body mechanics to promote community ambulation and return to jogging/running.            Plan  Patient would benefit from: PT eval and skilled physical therapy  Referral necessary: No  Planned modality interventions: TENS, ultrasound, manual electrical stimulation, electrical stimulation/[place]izen of [place] stimulation and microcurrent electrical stimulation  Planned therapy interventions: joint mobilization, activity modification, kinesiology taping, manual therapy, massage, balance, balance/weight bearing training, neuromuscular re-education, patient education, strengthening, stretching, therapeutic activities, therapeutic exercise, therapeutic training, flexibility, functional ROM exercises, gait training, graded activity, graded exercise, graded motor and home exercise program  Frequency: 2x week  Duration in visits: 12  Duration in weeks: 6  Treatment plan discussed with: patient        Subjective Evaluation    History of Present Illness  Date of onset: 2023  Mechanism of injury: trauma  Mechanism of injury: The patient is about two weeks out from the injury. The patient reports that she was in her slippers in her kitchen on tile floors when she was taking care of cleaning up after her dogs and while doing that she slipped on a spot that she had just mopped and the right leg went straight out in front of her and her left left buckled. The patient felt an electric shock immediately in her right leg with bruising setting in a few days later. The patient received imaging which revealed a right hamstring partial tear. Not a recurrent problem   Patient Goals  Patient goals for therapy: increased strength, increased motion, return to sport/leisure activities, independence with ADLs/IADLs and decreased pain (Get back into a workout routine)    Pain  Current pain ratin  At best pain ratin  At worst pain ratin  Location: Right hamstring, glutes, hip, and low back   Quality: dull ache and sharp  Relieving factors: change in position, rest and support (Laying down flat in bed)  Aggravating factors: running, sitting, standing, walking and stair climbing (Sitting up from a hard cushion chair; sitting in the car)  Progression: improved    Social Support  Steps to enter house: yes  3  Stairs in house: yes   15  Lives in: multiple-level home  Lives with: spouse (Two dogs)    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 No apparent swelling.     Active Range of Motion     Lumbar   Flexion:  with pain Restriction level: moderate  Extension:  with pain Restriction level: minimal  Left lateral flexion:  WFL  Right lateral flexion:  WFL  Left rotation:  WFL  Right rotation:  WFL  Left Hip   Normal active range of motion    Passive Range of Motion Additional Passive Range of Motion Details  PROM and AROM: hip flexion with knee straight- R: 42 degrees* L: 85 degrees     Strength/Myotome Testing     Left Hip   Planes of Motion   Flexion: 4  Extension: 3+  Abduction: 3+  External rotation: 3+  Internal rotation: 3+    Right Hip   Planes of Motion   Flexion: 3+  Extension: 3+  Abduction: 3+    Tests     Left Hip   Negative SHERRI and FADIR. Right Hip   Negative SHERRI and FADIR. Ambulation   Weight-Bearing Status   Weight-Bearing Status (Left): full weight bearing   Weight-Bearing Status (Right): full weight-bearing    Assistive device used: none    Observational Gait   Gait: antalgic       Flowsheet Rows    Flowsheet Row Most Recent Value   PT/OT G-Codes    Current Score 29   Projected Score 61             Precautions:   Past Medical History:   Diagnosis Date   • Acute non-recurrent maxillary sinusitis 12/30/2021   • DANIELE positive    • COVID-19 10/14/2022   • Eczema    • Elevated liver enzymes 12/30/2021   • GE reflux 12/30/2021   • GERD (gastroesophageal reflux disease)    • Joint pain    • Multiple thyroid nodules    • Psoriasis 12/30/2021   • Rhinitis, allergic    • Vitamin D deficiency 12/30/2021           Manuals 8/23/23      Visit IE                           Neuro Re-Ed       SLS        Hamstring set       SLR       Quad set       Clamshells       Butt kicks               Ther Ex       Hamstring stretch       Quad stretch       Calf Stretch                                          Ther Activity                     Gait Training                     Modalities                       Patient treated by HARSHIL Chun under my direct supervision.

## 2023-08-28 ENCOUNTER — OFFICE VISIT (OUTPATIENT)
Dept: PHYSICAL THERAPY | Facility: CLINIC | Age: 53
End: 2023-08-28
Payer: COMMERCIAL

## 2023-08-28 DIAGNOSIS — M25.551 ACUTE HIP PAIN, RIGHT: ICD-10-CM

## 2023-08-28 DIAGNOSIS — S76.311D PARTIAL HAMSTRING TEAR, RIGHT, SUBSEQUENT ENCOUNTER: Primary | ICD-10-CM

## 2023-08-28 DIAGNOSIS — R26.9 IMPAIRED GAIT: ICD-10-CM

## 2023-08-28 DIAGNOSIS — Z74.09 IMPAIRED FUNCTIONAL MOBILITY, BALANCE, AND ENDURANCE: ICD-10-CM

## 2023-08-28 PROCEDURE — 97112 NEUROMUSCULAR REEDUCATION: CPT | Performed by: PHYSICAL THERAPIST

## 2023-08-28 PROCEDURE — 97110 THERAPEUTIC EXERCISES: CPT | Performed by: PHYSICAL THERAPIST

## 2023-08-28 NOTE — PROGRESS NOTES
Daily Note     Today's date: 2023  Patient name: Thais Kwok  : 1970  MRN: [id number]  Referring provider: Herminio Pollock, *  Dx:   Encounter Diagnosis     ICD-10-CM    1. Partial hamstring tear, right, subsequent encounter  S76.311D       2. Acute hip pain, right  M25.551       3. Impaired gait  R26.9       4. Impaired functional mobility, balance, and endurance  Z74.09                      Subjective: Patient reports she feels she is moving better, she can walk easier, she continues to feel better with steps, though she still has pain. Objective: See treatment diary below      Assessment: Tolerated treatment well. Patient tolerated gentle increase in active ROM exercises without pain. Tolerated gentle hip mobility and flexibility exercise to decrease strain of the L hip with ambulation. Patient demonstrated fatigue post treatment, exhibited good technique with therapeutic exercises and would benefit from continued PT      Plan: Continue per plan of care. Progress treatment as tolerated. Progress challenge as appropriate with irritability. Precautions:   Past Medical History:   Diagnosis Date   • Acute non-recurrent maxillary sinusitis 2021   • DANIELE positive    • COVID-19 10/14/2022   • Eczema    • Elevated liver enzymes 2021   • GE reflux 2021   • GERD (gastroesophageal reflux disease)    • Joint pain    • Multiple thyroid nodules    • Psoriasis 2021   • Rhinitis, allergic    • Vitamin D deficiency 2021           Manuals 23     Visit IE      Hip PROM  R hip w/ flexion, ER, and gentle abduction.                    Neuro Re-Ed       SLS   2x10 w/ extension ea      Hamstring set       SLR  2x10     Quad set       Clamshells       Butt kicks   Standing 15x ea            Ther Ex       Hamstring stretch  10x10"     Quad stretch  10x10" prone strap     Calf Stretch       Prone Hip Ext  10x ea     LAQ  2x10     Bridges   5x                   Ther Activity                     Gait Training                     Modalities

## 2023-09-05 ENCOUNTER — OFFICE VISIT (OUTPATIENT)
Dept: PHYSICAL THERAPY | Facility: CLINIC | Age: 53
End: 2023-09-05
Payer: COMMERCIAL

## 2023-09-05 DIAGNOSIS — Z74.09 IMPAIRED FUNCTIONAL MOBILITY, BALANCE, AND ENDURANCE: ICD-10-CM

## 2023-09-05 DIAGNOSIS — R26.9 IMPAIRED GAIT: ICD-10-CM

## 2023-09-05 DIAGNOSIS — S76.311D PARTIAL HAMSTRING TEAR, RIGHT, SUBSEQUENT ENCOUNTER: Primary | ICD-10-CM

## 2023-09-05 DIAGNOSIS — M25.551 ACUTE HIP PAIN, RIGHT: ICD-10-CM

## 2023-09-05 PROCEDURE — 97112 NEUROMUSCULAR REEDUCATION: CPT | Performed by: PHYSICAL THERAPIST

## 2023-09-05 PROCEDURE — 97140 MANUAL THERAPY 1/> REGIONS: CPT | Performed by: PHYSICAL THERAPIST

## 2023-09-05 PROCEDURE — 97110 THERAPEUTIC EXERCISES: CPT | Performed by: PHYSICAL THERAPIST

## 2023-09-06 NOTE — PROGRESS NOTES
Daily Note     Today's date: 2023  Patient name: Lan Obregon  : 1970  MRN: [id number]  Referring provider: Lavern Patel, *  Dx:   Encounter Diagnosis     ICD-10-CM    1. Partial hamstring tear, right, subsequent encounter  S76.311D       2. Acute hip pain, right  M25.551       3. Impaired functional mobility, balance, and endurance  Z74.09       4. Impaired gait  R26.9                      Subjective: Patient reports she feels some improvement, but continues to have pain and pain with stairs. Objective: See treatment diary below      Assessment: Tolerated treatment well. Patient demonstrates improved tolerance to AROM, continued to emphasize flexibility within a pain free ROM. Patient demonstrated fatigue post treatment, exhibited good technique with therapeutic exercises and would benefit from continued PT      Plan: Continue per plan of care. Progress treatment as tolerated. Progress challenge as appropriate with irritability. Precautions:   Past Medical History:   Diagnosis Date   • Acute non-recurrent maxillary sinusitis 2021   • DANIELE positive    • COVID-19 10/14/2022   • Eczema    • Elevated liver enzymes 2021   • GE reflux 2021   • GERD (gastroesophageal reflux disease)    • Joint pain    • Multiple thyroid nodules    • Psoriasis 2021   • Rhinitis, allergic    • Vitamin D deficiency 2021           Manuals 23    Visit IE 2 3    Hip PROM  R hip w/ flexion, ER, and gentle abduction.  R hip w/ flexion, ER, and abduction                  Neuro Re-Ed       SLS   2x10 w/ extension ea  2x10 w/ ext    Hamstring set       SLR  2x10 2x10 ea    Quad set       Clamshells       Butt kicks   Standing 15x ea 2x10 prone, 2x10 standing           Ther Ex       Hamstring stretch  10x10" 10x10" 90-90 5x10"    Quad stretch  10x10" prone strap     Calf Stretch       Prone Hip Ext  10x ea 10x ea    LAQ  2x10 10x    Bridges   5x 15x    Mini Squat 2x10    Seated Hamstring Str    10x10"    Ther Activity                     Gait Training                     Modalities

## 2023-09-13 ENCOUNTER — APPOINTMENT (OUTPATIENT)
Dept: PHYSICAL THERAPY | Facility: CLINIC | Age: 53
End: 2023-09-13
Payer: COMMERCIAL

## 2023-09-20 ENCOUNTER — OFFICE VISIT (OUTPATIENT)
Dept: PHYSICAL THERAPY | Facility: CLINIC | Age: 53
End: 2023-09-20
Payer: COMMERCIAL

## 2023-09-20 DIAGNOSIS — Z74.09 IMPAIRED FUNCTIONAL MOBILITY, BALANCE, AND ENDURANCE: ICD-10-CM

## 2023-09-20 DIAGNOSIS — R26.9 IMPAIRED GAIT: ICD-10-CM

## 2023-09-20 DIAGNOSIS — M25.551 ACUTE HIP PAIN, RIGHT: ICD-10-CM

## 2023-09-20 DIAGNOSIS — S76.311D PARTIAL HAMSTRING TEAR, RIGHT, SUBSEQUENT ENCOUNTER: Primary | ICD-10-CM

## 2023-09-20 PROCEDURE — 97110 THERAPEUTIC EXERCISES: CPT | Performed by: PHYSICAL THERAPIST

## 2023-09-20 PROCEDURE — 97112 NEUROMUSCULAR REEDUCATION: CPT | Performed by: PHYSICAL THERAPIST

## 2023-09-20 NOTE — PROGRESS NOTES
Daily Note     Today's date: 2023  Patient name: Krys Veliz  : 1970  MRN: [id number]  Referring provider: Mitch Boucher, *  Dx:   Encounter Diagnosis     ICD-10-CM    1. Partial hamstring tear, right, subsequent encounter  S76.311D       2. Acute hip pain, right  M25.551       3. Impaired functional mobility, balance, and endurance  Z74.09       4. Impaired gait  R26.9                      Subjective: Patient reports she feels she continues to improve slowly. Objective: See treatment diary below      Assessment: Tolerated treatment well. Patient continues to demonstrate improvement with hamstring strength and active ROM, continued to emphasize isometrics and pain free flexibility. Patient demonstrated fatigue post treatment, exhibited good technique with therapeutic exercises and would benefit from continued PT      Plan: Continue per plan of care. Progress treatment as tolerated. Progress challenge as appropriate with irritability. Precautions:   Past Medical History:   Diagnosis Date   • Acute non-recurrent maxillary sinusitis 2021   • DANIELE positive    • COVID-19 10/14/2022   • Eczema    • Elevated liver enzymes 2021   • GE reflux 2021   • GERD (gastroesophageal reflux disease)    • Joint pain    • Multiple thyroid nodules    • Psoriasis 2021   • Rhinitis, allergic    • Vitamin D deficiency 2021           Manuals  23   Visit IE 2 3 4   Hip PROM  R hip w/ flexion, ER, and gentle abduction.  R hip w/ flexion, ER, and abduction                  Neuro Re-Ed       SLS   2x10 w/ extension ea  2x10 w/ ext 2x10   Hamstring set    10x10"   SLR  2x10 2x10 ea 2x10 ea   Quad set       Clamshells       Butt kicks   Standing 15x ea 2x10 prone, 2x10 standing 2x10 prone, 2x10 seated           Ther Ex       Hamstring stretch  10x10" 10x10" 90-90 5x10" 10x10" 90-90 5x10"   Quad stretch  10x10" prone strap     Reverse Walking    5 laps 20 feet Prone Hip Ext  10x ea 10x ea 10xea   LAQ  2x10 10x 10x   Bridges   5x 15x 2x10   Mini Squat   2x10 10x5s ball on wall   Seated Hamstring Str    10x10" 10x10"   Ther Activity                     Gait Training                     Modalities

## 2023-09-27 ENCOUNTER — APPOINTMENT (OUTPATIENT)
Dept: PHYSICAL THERAPY | Facility: CLINIC | Age: 53
End: 2023-09-27
Payer: COMMERCIAL

## 2023-10-04 ENCOUNTER — OFFICE VISIT (OUTPATIENT)
Dept: PHYSICAL THERAPY | Facility: CLINIC | Age: 53
End: 2023-10-04
Payer: COMMERCIAL

## 2023-10-04 DIAGNOSIS — S76.311D PARTIAL HAMSTRING TEAR, RIGHT, SUBSEQUENT ENCOUNTER: Primary | ICD-10-CM

## 2023-10-04 DIAGNOSIS — M25.551 ACUTE HIP PAIN, RIGHT: ICD-10-CM

## 2023-10-04 DIAGNOSIS — Z74.09 IMPAIRED FUNCTIONAL MOBILITY, BALANCE, AND ENDURANCE: ICD-10-CM

## 2023-10-04 DIAGNOSIS — R26.9 IMPAIRED GAIT: ICD-10-CM

## 2023-10-04 PROCEDURE — 97112 NEUROMUSCULAR REEDUCATION: CPT

## 2023-10-04 PROCEDURE — 97110 THERAPEUTIC EXERCISES: CPT

## 2023-10-04 NOTE — PROGRESS NOTES
Daily Note     Today's date: 10/4/2023  Patient name: Kiera Gaming  : 1970  MRN: [id number]  Referring provider: Virgilio Briggs, *  Dx:   Encounter Diagnosis     ICD-10-CM    1. Partial hamstring tear, right, subsequent encounter  S76.311D       2. Acute hip pain, right  M25.551       3. Impaired functional mobility, balance, and endurance  Z74.09       4. Impaired gait  R26.9           Start Time: 1615  Stop Time: 1700  Total time in clinic (min): 45 minutes    Subjective: Kiera Gaming arrived to session with 1-2/10 pain intensity in the right posterior thigh. HEP completion does not lead to increased aggravation. Patient reports decreased confidence with community ambulation or completing exercises that may cause over-exertion of the right hamstrings. Objective: See treatment diary below      Assessment: Tolerated treatment well. Continued to practice exercises with progression of eccentric loading through ROM to tolerance. Patient reported pain aggravation not exceeding 5/10 via VAS throughout the session. Patient continues to demonstrate pain-avoidance behavior in terms of completing exercises so use of hand support was encouraged with reminder of phasing out UE support as confidence in exercises improves. Patient demonstrated fatigue post treatment, exhibited good technique with therapeutic exercises and would benefit from continued PT      Plan: Continue per plan of care. Progress treatment as tolerated.        Precautions:   Past Medical History:   Diagnosis Date   • Acute non-recurrent maxillary sinusitis 2021   • DANIELE positive    • COVID-19 10/14/2022   • Eczema    • Elevated liver enzymes 2021   • GE reflux 2021   • GERD (gastroesophageal reflux disease)    • Joint pain    • Multiple thyroid nodules    • Psoriasis 2021   • Rhinitis, allergic    • Vitamin D deficiency 2021           Manuals  8/29/23 9/5/23 9/20/23 10/4   Visit IE 2 3 4 5   Hip PROM  R hip w/ flexion, ER, and gentle abduction.  R hip w/ flexion, ER, and abduction                     Neuro Re-Ed        Bridges      3x10 on heels    SLS   2x10 w/ extension ea  2x10 w/ ext 2x10    Hamstring set    10x10"    SLR  2x10 2x10 ea 2x10 ea    Quad set        Clamshells        Butt kicks   Standing 15x ea 2x10 prone, 2x10 standing 2x10 prone, 2x10 seated  2x10 prone, VCs for ecc  1x10 Standing   SL RDL     1x10 B inc sxs    Lunges     1x10 B with 1 UE on chair   Step Ups with Hip Drive     [id number] B   On/Off Horse     2x10 RLE   Ther Ex        Hamstring stretch  10x10" 10x10" 90-90 5x10" 10x10" 90-90 5x10" 5x20"   Quad stretch  10x10" prone strap      Reverse Walking    5 laps 20 feet    Prone Hip Ext  10x ea 10x ea 10xea    LAQ  2x10 10x 10x 2x10 B   Bridges   5x 15x 2x10    Mini Squat   2x10 10x5s ball on wall 2x10   Seated Hamstring Str    10x10" 10x10" 2x30s   Ther Activity                        Gait Training                        Modalities

## 2023-10-11 ENCOUNTER — APPOINTMENT (OUTPATIENT)
Dept: PHYSICAL THERAPY | Facility: CLINIC | Age: 53
End: 2023-10-11
Payer: COMMERCIAL

## 2023-10-19 ENCOUNTER — OFFICE VISIT (OUTPATIENT)
Dept: PHYSICAL THERAPY | Facility: CLINIC | Age: 53
End: 2023-10-19
Payer: COMMERCIAL

## 2023-10-19 DIAGNOSIS — S76.311D PARTIAL HAMSTRING TEAR, RIGHT, SUBSEQUENT ENCOUNTER: Primary | ICD-10-CM

## 2023-10-19 DIAGNOSIS — R26.9 IMPAIRED GAIT: ICD-10-CM

## 2023-10-19 DIAGNOSIS — Z74.09 IMPAIRED FUNCTIONAL MOBILITY, BALANCE, AND ENDURANCE: ICD-10-CM

## 2023-10-19 DIAGNOSIS — M25.551 ACUTE HIP PAIN, RIGHT: ICD-10-CM

## 2023-10-19 PROCEDURE — 97110 THERAPEUTIC EXERCISES: CPT | Performed by: PHYSICAL THERAPIST

## 2023-10-19 PROCEDURE — 97112 NEUROMUSCULAR REEDUCATION: CPT | Performed by: PHYSICAL THERAPIST

## 2023-10-19 NOTE — PROGRESS NOTES
PT Re-Evaluation     Today's date: 10/19/2023  Patient name: Bertha Field  : 1970  MRN: [id number]  Referring provider: Everett Rahman, *  Dx:   Encounter Diagnosis     ICD-10-CM    1. Partial hamstring tear, right, subsequent encounter  S76.311D       2. Impaired functional mobility, balance, and endurance  Z74.09       3. Impaired gait  R26.9       4. Acute hip pain, right  M25.551                    Assessment  Assessment details: Bertha Field is a 48 y.o. female presenting to PT with chief complaint of acute right leg pain following a slip onto an extended leg. She demonstrates significant progress with hip and knee ROM along with hamstring flexibility. This has accompanied decreased pain with functional activities including walking and navigating steps. She continues to have weakness of knee flexors and extensors along with hip extensors and abductors. She demonstrates continued functional limitations with pain with squatting and kneeling to the floor. The patient's presentation is consistent with the referring diagnosis of partial right hamstring tear. The patient's current condition continues to result in the below impairments and functional impairments, most notably impeding her ability to have an exercise routine, perform ADLs/iADLs, and walk around the community. The patient would benefit from skilled physical therapy to restore hip ROM, strength, gait mechanics, and restore functional strength to PLOF. The patient was educated on current diagnosis, prognosis, HEP, and POC.      Impairments: abnormal gait, abnormal or restricted ROM, activity intolerance, impaired balance, impaired physical strength, lacks appropriate home exercise program, pain with function, weight-bearing intolerance and poor body mechanics  Functional limitations: difficulty with stairs; difficulty getting in and out of a car; difficulty bending and lifting; difficulty with sitting up from a chair; inability to dance; inability to run; difficulty walking   Symptom irritability: highUnderstanding of Dx/Px/POC: good   Prognosis: good    Goals  ST-4 weeks  1. The patient will demonstrate independence with HEP in order to improve patient outcomes between sessions. -met  2. The patient will demonstrate improved hip flexion ROM by >10 degrees to improve getting in and out of car. -met  3. The patient will be able to walk for . 5 mile with pain <3/10 and body mechanics in order to promote ability to run errands. -met  4. The patient will be able to perform a STS 5x with pain <3/10 in order to improve ability to sit at desk for work. -progressing towards  5. The patient will be able to ascend/descend a flight of stairs with pain <2/10 in order to promote ability to navigate home. -progressing towards  LT-6 weeks   1. The patient will demonstrate improved hip strength in all deficit planes by >1 MMT score. -met  2. The patient will demonstrate improved hip flexion ROM by >20 degrees to restore ability to dance and exercise. -progressing towards  3.  The patient will be able to walk >15 minutes with pain <2/10 and proper body mechanics to promote community ambulation and return to jogging/running. -progressing towards          Plan  Patient would benefit from: PT eval and skilled physical therapy  Referral necessary: No  Planned modality interventions: TENS, ultrasound, manual electrical stimulation, electrical stimulation/[place]i stimulation and microcurrent electrical stimulation  Planned therapy interventions: joint mobilization, activity modification, kinesiology taping, manual therapy, massage, balance, balance/weight bearing training, neuromuscular re-education, patient education, strengthening, stretching, therapeutic activities, therapeutic exercise, therapeutic training, flexibility, functional ROM exercises, gait training, graded activity, graded exercise, graded motor and home exercise program  Frequency: 2x week  Duration in visits: 12  Duration in weeks: 6  Treatment plan discussed with: patient        Subjective Evaluation    History of Present Illness  Date of onset: 2023  Mechanism of injury: trauma  Mechanism of injury: She reports she feels better overall, she continues to have difficulty walking on unstable surfaces and difficulty with higher level exercises or workouts. She feels less pain overall, she continues to have some pain around the "sit bone."          Not a recurrent problem   Patient Goals  Patient goals for therapy: increased strength, increased motion, return to sport/leisure activities, independence with ADLs/IADLs and decreased pain (Get back into a workout routine)    Pain  Current pain ratin  At best pain ratin  At worst pain ratin  Location: Right hamstring, glutes, hip, and low back   Quality: dull ache and sharp  Relieving factors: change in position, rest and support (Laying down flat in bed)  Aggravating factors: running, sitting, standing, walking and stair climbing (Sitting up from a hard cushion chair; sitting in the car)  Progression: improved    Social Support  Steps to enter house: yes  3  Stairs in house: yes   15  Lives in: multiple-level home  Lives with: spouse (Two dogs)    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 No apparent swelling.     Active Range of Motion     Lumbar   Flexion:  with pain Restriction level: moderate  Extension:  with pain Restriction level: minimal  Left lateral flexion:  WFL  Right lateral flexion:  WFL  Left rotation:  WFL  Right rotation:  WFL  Left Hip   Normal active range of motion    Passive Range of Motion     Additional Passive Range of Motion Details  PROM and AROM: hip flexion with knee straight- R: 95 degrees* L: 85 degrees     Strength/Myotome Testing     Left Hip   Planes of Motion   Flexion: 4  Extension: 3+  Abduction: 3+  External rotation: 3+  Internal rotation: 3+    Right Hip   Planes of Motion   Flexion: 4  Extension: 4  Abduction: 4-    Knee Flexion: 4  Knee Extension: 4    Tests     Left Hip   Negative SHERRI and FADIR. Right Hip   Negative SHERRI and FADIR. Ambulation   Weight-Bearing Status   Weight-Bearing Status (Left): full weight bearing   Weight-Bearing Status (Right): full weight-bearing    Assistive device used: none    Observational Gait   Gait: reciprocal.        Precautions:   Past Medical History:   Diagnosis Date    Acute non-recurrent maxillary sinusitis 12/30/2021    DANIELE positive     COVID-19 10/14/2022    Eczema     Elevated liver enzymes 12/30/2021    GE reflux 12/30/2021    GERD (gastroesophageal reflux disease)     Joint pain     Multiple thyroid nodules     Psoriasis 12/30/2021    Rhinitis, allergic     Vitamin D deficiency 12/30/2021     Manuals  8/29/23 9/5/23 10/19/23   Visit IE 2 3 6   Hip PROM  R hip w/ flexion, ER, and gentle abduction.  R hip w/ flexion, ER, and abduction                  Neuro Re-Ed       Bridges     3x10   SLS   2x10 w/ extension ea  2x10 w/ ext    Hamstring set       SLR  2x10 2x10 ea    Quad set       Clamshells       Butt kicks   Standing 15x ea 2x10 prone, 2x10 standing 2x10 prone w/ raise   SL RDL       Lunges       Step Ups with Hip Drive       Lat Step Down     L6 2x10   Ther Ex       Hamstring stretch  10x10" 10x10" 90-90 5x10"    MRE Knee Flexion    MRE 2x10   Hamstring Scoots on Stool    10 laps 15 feet   Quad stretch  10x10" prone strap     Reverse Walking       Prone Hip Ext  10x ea 10x ea    LAQ  2x10 10x    Bridges   5x 15x    Mini Squat   2x10 2x10 w/ staggered stance, 2x10 on wall   Seated Hamstring Str    10x10"    Ther Activity                     Gait Training Modalities

## 2023-10-19 NOTE — PROGRESS NOTES
Daily Note     Today's date: 10/19/2023  Patient name: Kira Logan  : 1970  MRN: [id number]  Referring provider: Moi Zuniga, *  Dx:   Encounter Diagnosis     ICD-10-CM    1. Partial hamstring tear, right, subsequent encounter  S76.311D       2. Impaired functional mobility, balance, and endurance  Z74.09       3. Impaired gait  R26.9       4. Acute hip pain, right  M25.551                      Subjective: ***      Objective: See treatment diary below      Assessment: Tolerated treatment well. Patient demonstrated fatigue post treatment, exhibited good technique with therapeutic exercises, and would benefit from continued PT      Plan: Continue per plan of care. Progress treatment as tolerated. Progress challenge as appropriate with irritability. Precautions:   Past Medical History:   Diagnosis Date    Acute non-recurrent maxillary sinusitis 2021    DANIELE positive     COVID-19 10/14/2022    Eczema     Elevated liver enzymes 2021    GE reflux 2021    GERD (gastroesophageal reflux disease)     Joint pain     Multiple thyroid nodules     Psoriasis 2021    Rhinitis, allergic     Vitamin D deficiency 2021           Manuals  23 10   Visit IE 2 3 4 5   Hip PROM  R hip w/ flexion, ER, and gentle abduction.  R hip w/ flexion, ER, and abduction                     Neuro Re-Ed        Bridges      3x10 on heels    SLS   2x10 w/ extension ea  2x10 w/ ext 2x10    Hamstring set    10x10"    SLR  2x10 2x10 ea 2x10 ea    Quad set        Clamshells        Butt kicks   Standing 15x ea 2x10 prone, 2x10 standing 2x10 prone, 2x10 seated  2x10 prone, VCs for ecc  1x10 Standing   SL RDL     1x10 B inc sxs    Lunges     1x10 B with 1 UE on chair   Step Ups with Hip Drive     3Z39 B   On/Off Horse     2x10 RLE   Ther Ex        Hamstring stretch  10x10" 10x10" 90-90 5x10" 10x10" 90-90 5x10" 5x20"   Quad stretch  10x10" prone strap      Reverse Walking    5 laps 20 feet    Prone Hip Ext  10x ea 10x ea 10xea    LAQ  2x10 10x 10x 2x10 B   Bridges   5x 15x 2x10    Mini Squat   2x10 10x5s ball on wall 2x10   Seated Hamstring Str    10x10" 10x10" 2x30s   Ther Activity                        Gait Training                        Modalities

## 2023-10-25 ENCOUNTER — APPOINTMENT (OUTPATIENT)
Dept: PHYSICAL THERAPY | Facility: CLINIC | Age: 53
End: 2023-10-25
Payer: COMMERCIAL

## 2023-11-28 ENCOUNTER — OFFICE VISIT (OUTPATIENT)
Dept: URGENT CARE | Facility: CLINIC | Age: 53
End: 2023-11-28
Payer: COMMERCIAL

## 2023-11-28 VITALS
TEMPERATURE: 98.2 F | HEIGHT: 62 IN | BODY MASS INDEX: 26.68 KG/M2 | RESPIRATION RATE: 20 BRPM | HEART RATE: 102 BPM | WEIGHT: 145 LBS | OXYGEN SATURATION: 97 %

## 2023-11-28 DIAGNOSIS — B34.9 VIRAL ILLNESS: Primary | ICD-10-CM

## 2023-11-28 PROCEDURE — 87636 SARSCOV2 & INF A&B AMP PRB: CPT | Performed by: PHYSICIAN ASSISTANT

## 2023-11-28 PROCEDURE — 99213 OFFICE O/P EST LOW 20 MIN: CPT | Performed by: PHYSICIAN ASSISTANT

## 2023-11-28 RX ORDER — BROMPHENIRAMINE MALEATE, PSEUDOEPHEDRINE HYDROCHLORIDE, AND DEXTROMETHORPHAN HYDROBROMIDE 2; 30; 10 MG/5ML; MG/5ML; MG/5ML
10 SYRUP ORAL 4 TIMES DAILY PRN
Qty: 120 ML | Refills: 0 | Status: SHIPPED | OUTPATIENT
Start: 2023-11-28

## 2023-11-28 RX ORDER — ALBUTEROL SULFATE 90 UG/1
2 AEROSOL, METERED RESPIRATORY (INHALATION) EVERY 6 HOURS PRN
Qty: 8 G | Refills: 0 | Status: SHIPPED | OUTPATIENT
Start: 2023-11-28

## 2023-11-28 RX ORDER — ONDANSETRON 4 MG/1
4 TABLET, FILM COATED ORAL EVERY 8 HOURS PRN
Qty: 20 TABLET | Refills: 0 | Status: SHIPPED | OUTPATIENT
Start: 2023-11-28

## 2023-11-29 LAB
FLUAV RNA RESP QL NAA+PROBE: NEGATIVE
FLUBV RNA RESP QL NAA+PROBE: NEGATIVE
SARS-COV-2 RNA RESP QL NAA+PROBE: NEGATIVE

## 2023-11-29 NOTE — PROGRESS NOTES
[facility]        NAME: Massiel Núñez is a 48 y.o. female  : 1970    MRN: [id number]  DATE: 2023  TIME: 7:17 PM    Assessment and Plan   Viral illness [B34.9]  1. Viral illness  Covid/Flu-Office Collect    albuterol (PROVENTIL HFA,VENTOLIN HFA) 90 mcg/act inhaler    brompheniramine-pseudoephedrine-DM 30-2-10 MG/5ML syrup    ondansetron (ZOFRAN) 4 mg tablet            Patient Instructions     Patient Instructions   Normal exam today. I suspect you may have the flu. Take medications as discussed. May continue taking your Advil/Tylenol for fevers. Follow up with PCP in 3-5 days. Proceed to  ER if symptoms worsen. Chief Complaint     Chief Complaint   Patient presents with    viral illness     Began Friday with sore throat, congestion, swollen glands, cough chest discomfort temp 101         History of Present Illness       The patient is a 28-year-old female presenting today for a sore throat, congestion, swollen glands, temperature up to 101 and chest discomfort when coughing x 5 days. She admits that this feels similar to when she had COVID in the past.  Admits to feeling like she needs to sleep more. She denies any shortness of breath. Denies any chest pain but does admit to slight sternal chest discomfort when having a deep cough. Reports 1 episode of diarrhea this morning and 2 episodes of vomiting over the last 2 days. Does admit to slight associated nausea. Review of Systems   Review of Systems   Constitutional:  Positive for fever. Negative for activity change, appetite change, chills and fatigue. HENT:  Positive for congestion, ear pain, sinus pressure, sinus pain and sore throat. Negative for rhinorrhea. Eyes:  Negative for pain and visual disturbance. Respiratory:  Positive for cough. Negative for chest tightness and shortness of breath. Cardiovascular:  Negative for chest pain and palpitations.    Gastrointestinal:  Negative for abdominal pain, diarrhea, nausea and vomiting. Genitourinary:  Negative for dysuria and hematuria. Musculoskeletal:  Negative for arthralgias, back pain and myalgias. Skin:  Negative for color change, pallor and rash. Neurological:  Negative for seizures, syncope and headaches. All other systems reviewed and are negative.         Current Medications       Current Outpatient Medications:     albuterol (PROVENTIL HFA,VENTOLIN HFA) 90 mcg/act inhaler, Inhale 2 puffs every 6 (six) hours as needed for wheezing, Disp: 8 g, Rfl: 0    betamethasone dipropionate 0.05 % lotion, APPLY 1 TOPICALLY 2 TIMES A DAY TO PSORIAISS ON SCALP X 2 WEEKS /AS NEEDED, Disp: , Rfl:     brompheniramine-pseudoephedrine-DM 30-2-10 MG/5ML syrup, Take 10 mL by mouth 4 (four) times a day as needed for congestion or cough, Disp: 120 mL, Rfl: 0    calcipotriene (DOVONEX) 0.005 % cream, APPLY 1 TOPICALLY TWICE A DAY TO PSORIASIS PLAQUES WHEN NOT USING STEROID OINT, Disp: , Rfl:     desonide (DESOWEN) 0.05 % cream, daily as needed, Disp: , Rfl: 0    Enstilar 0.005-0.064 % FOAM, APPLY TOPICALLY EVERY DAY TO PSORIASIS PLAQUES ON TRUNK, Disp: , Rfl:     fluocinolone (SYNALAR) 0.025 % ointment, APPLY 1 TOPICALLY TWICE A DAY TO PSORIASIS ON FACE X 1 WEEK / AS NEEDED FOR FLARES, Disp: , Rfl:     ibuprofen (ADVIL,MOTRIN) 100 MG chewable tablet, Chew 100 mg every 8 (eight) hours as needed for mild pain, Disp: , Rfl:     ondansetron (ZOFRAN) 4 mg tablet, Take 1 tablet (4 mg total) by mouth every 8 (eight) hours as needed for nausea or vomiting, Disp: 20 tablet, Rfl: 0    pantoprazole (PROTONIX) 40 mg tablet, Take 1 tablet (40 mg total) by mouth daily, Disp: 30 tablet, Rfl: 2    cyclobenzaprine (FLEXERIL) 10 mg tablet, Take 1 tablet (10 mg total) by mouth daily at bedtime (Patient not taking: Reported on 8/8/2023), Disp: 14 tablet, Rfl: 0    multivitamin (THERAGRAN) TABS, Take 1 tablet by mouth daily, Disp: , Rfl:     Current Allergies     Allergies as of 11/28/2023 - Reviewed 11/28/2023   Allergen Reaction Noted    Celecoxib Swelling 02/16/2016    Penicillins Other (See Comments) 02/16/2016            The following portions of the patient's history were reviewed and updated as appropriate: allergies, current medications, past family history, past medical history, past social history, past surgical history and problem list.     Past Medical History:   Diagnosis Date    Acute non-recurrent maxillary sinusitis 12/30/2021    DANIELE positive     COVID-19 10/14/2022    Eczema     Elevated liver enzymes 12/30/2021    GE reflux 12/30/2021    GERD (gastroesophageal reflux disease)     Joint pain     Multiple thyroid nodules     Psoriasis 12/30/2021    Rhinitis, allergic     Vitamin D deficiency 12/30/2021       Past Surgical History:   Procedure Laterality Date    ANAL FISSURECTOMY      MAMMO (HISTORICAL) Bilateral     SHOULDER SURGERY Left        Family History   Problem Relation Age of Onset    No Known Problems Mother     Eczema Father     COPD Father     No Known Problems Sister     No Known Problems Brother     No Known Problems Maternal Grandmother     No Known Problems Maternal Grandfather     Psoriasis Paternal Grandmother     No Known Problems Paternal Grandfather     No Known Problems Maternal Aunt     No Known Problems Maternal Uncle     No Known Problems Paternal Aunt     No Known Problems Paternal Uncle          Medications have been verified. Objective   Pulse 102   Temp 98.2 °F (36.8 °C)   Resp 20   Ht 5' 2" (1.575 m)   Wt 65.8 kg (145 lb)   LMP 02/15/2019 (Exact Date)   SpO2 97%   BMI 26.52 kg/m²        Physical Exam     Physical Exam  Vitals and nursing note reviewed. Constitutional:       General: She is not in acute distress. Appearance: Normal appearance. She is well-developed and normal weight. She is not ill-appearing, toxic-appearing or diaphoretic. HENT:      Head: Normocephalic and atraumatic.       Right Ear: Tympanic membrane, ear canal and external ear normal. No drainage, swelling or tenderness. No middle ear effusion. There is no impacted cerumen. Tympanic membrane is not erythematous. Left Ear: Tympanic membrane, ear canal and external ear normal. No drainage, swelling or tenderness. No middle ear effusion. There is no impacted cerumen. Tympanic membrane is not erythematous. Nose: Nose normal. No congestion or rhinorrhea. Mouth/Throat:      Mouth: Mucous membranes are moist. No oral lesions. Pharynx: Oropharynx is clear. Uvula midline. Posterior oropharyngeal erythema present. No pharyngeal swelling, oropharyngeal exudate or uvula swelling. Tonsils: No tonsillar exudate or tonsillar abscesses. 0 on the right. 0 on the left. Eyes:      Extraocular Movements:      Right eye: Normal extraocular motion. Left eye: Normal extraocular motion. Conjunctiva/sclera: Conjunctivae normal.      Pupils: Pupils are equal, round, and reactive to light. Cardiovascular:      Rate and Rhythm: Normal rate and regular rhythm. Heart sounds: Normal heart sounds. No murmur heard. No friction rub. No gallop. Pulmonary:      Effort: Pulmonary effort is normal. No respiratory distress. Breath sounds: Normal breath sounds. No stridor. No wheezing, rhonchi or rales. Chest:      Chest wall: No tenderness. Skin:     General: Skin is warm and dry. Capillary Refill: Capillary refill takes less than 2 seconds. Neurological:      Mental Status: She is alert.

## 2023-11-29 NOTE — PATIENT INSTRUCTIONS
Normal exam today. I suspect you may have the flu. Take medications as discussed. May continue taking your Advil/Tylenol for fevers.

## 2025-08-01 ENCOUNTER — APPOINTMENT (EMERGENCY)
Dept: RADIOLOGY | Facility: HOSPITAL | Age: 55
End: 2025-08-01
Payer: COMMERCIAL

## 2025-08-01 ENCOUNTER — HOSPITAL ENCOUNTER (EMERGENCY)
Facility: HOSPITAL | Age: 55
Discharge: HOME | End: 2025-08-01
Attending: EMERGENCY MEDICINE
Payer: COMMERCIAL

## 2025-08-01 VITALS
WEIGHT: 120 LBS | OXYGEN SATURATION: 99 % | TEMPERATURE: 97.7 F | HEART RATE: 98 BPM | BODY MASS INDEX: 21.95 KG/M2 | SYSTOLIC BLOOD PRESSURE: 135 MMHG | DIASTOLIC BLOOD PRESSURE: 65 MMHG | RESPIRATION RATE: 20 BRPM

## 2025-08-01 DIAGNOSIS — F10.90 ALCOHOL USE: ICD-10-CM

## 2025-08-01 DIAGNOSIS — R10.9 ABDOMINAL PAIN: Primary | ICD-10-CM

## 2025-08-01 DIAGNOSIS — K76.0 FATTY LIVER: ICD-10-CM

## 2025-08-01 LAB
2HR DELTA HS TROPONIN: 0 NG/L
ALBUMIN SERPL BCG-MCNC: 4.8 G/DL (ref 3.5–5)
ALP SERPL-CCNC: 144 U/L (ref 34–104)
ALT SERPL W P-5'-P-CCNC: 147 U/L (ref 7–52)
ANION GAP SERPL CALCULATED.3IONS-SCNC: 29 MMOL/L (ref 4–13)
AST SERPL W P-5'-P-CCNC: 161 U/L (ref 13–39)
BACTERIA UR QL AUTO: ABNORMAL /HPF
BILIRUB SERPL-MCNC: 1.74 MG/DL (ref 0.2–1)
BILIRUB UR QL STRIP: NEGATIVE
BUN SERPL-MCNC: 6 MG/DL (ref 5–25)
CALCIUM SERPL-MCNC: 9.9 MG/DL (ref 8.4–10.2)
CARDIAC TROPONIN I PNL SERPL HS: 3 NG/L (ref ?–50)
CARDIAC TROPONIN I PNL SERPL HS: 3 NG/L (ref ?–50)
CHLORIDE SERPL-SCNC: 90 MMOL/L (ref 96–108)
CLARITY UR: CLEAR
CO2 SERPL-SCNC: 13 MMOL/L (ref 21–32)
COLOR UR: YELLOW
CREAT SERPL-MCNC: 0.64 MG/DL (ref 0.6–1.3)
ERYTHROCYTE [DISTWIDTH] IN BLOOD BY AUTOMATED COUNT: 11.8 % (ref 11.6–15.1)
GFR SERPL CREATININE-BSD FRML MDRD: 102 ML/MIN/1.73SQ M
GLUCOSE SERPL-MCNC: 69 MG/DL (ref 65–140)
GLUCOSE UR STRIP-MCNC: NEGATIVE MG/DL
HCT VFR BLD AUTO: 42.4 % (ref 34.8–46.1)
HGB BLD-MCNC: 14.8 G/DL (ref 11.5–15.4)
HGB UR QL STRIP.AUTO: NEGATIVE
KETONES UR STRIP-MCNC: ABNORMAL MG/DL
LEUKOCYTE ESTERASE UR QL STRIP: NEGATIVE
LIPASE SERPL-CCNC: 65 U/L (ref 11–82)
MCH RBC QN AUTO: 37.5 PG (ref 26.8–34.3)
MCHC RBC AUTO-ENTMCNC: 34.9 G/DL (ref 31.4–37.4)
MCV RBC AUTO: 107 FL (ref 82–98)
MUCOUS THREADS UR QL AUTO: ABNORMAL
NITRITE UR QL STRIP: NEGATIVE
NON-SQ EPI CELLS URNS QL MICRO: ABNORMAL /HPF
PH UR STRIP.AUTO: 5.5 [PH]
PLATELET # BLD AUTO: 97 THOUSANDS/UL (ref 149–390)
PMV BLD AUTO: 8.9 FL (ref 8.9–12.7)
POTASSIUM SERPL-SCNC: 3.6 MMOL/L (ref 3.5–5.3)
PROT SERPL-MCNC: 7.3 G/DL (ref 6.4–8.4)
PROT UR STRIP-MCNC: ABNORMAL MG/DL
RBC # BLD AUTO: 3.95 MILLION/UL (ref 3.81–5.12)
RBC #/AREA URNS AUTO: ABNORMAL /HPF
SODIUM SERPL-SCNC: 132 MMOL/L (ref 135–147)
SP GR UR STRIP.AUTO: >=1.03 (ref 1–1.03)
UROBILINOGEN UR STRIP-ACNC: 2 MG/DL
WBC # BLD AUTO: 5.59 THOUSAND/UL (ref 4.31–10.16)
WBC #/AREA URNS AUTO: ABNORMAL /HPF

## 2025-08-01 PROCEDURE — 96361 HYDRATE IV INFUSION ADD-ON: CPT

## 2025-08-01 PROCEDURE — 80053 COMPREHEN METABOLIC PANEL: CPT | Performed by: EMERGENCY MEDICINE

## 2025-08-01 PROCEDURE — 99284 EMERGENCY DEPT VISIT MOD MDM: CPT

## 2025-08-01 PROCEDURE — 83690 ASSAY OF LIPASE: CPT | Performed by: EMERGENCY MEDICINE

## 2025-08-01 PROCEDURE — 96360 HYDRATION IV INFUSION INIT: CPT

## 2025-08-01 PROCEDURE — 84484 ASSAY OF TROPONIN QUANT: CPT | Performed by: EMERGENCY MEDICINE

## 2025-08-01 PROCEDURE — 74177 CT ABD & PELVIS W/CONTRAST: CPT

## 2025-08-01 PROCEDURE — 93005 ELECTROCARDIOGRAM TRACING: CPT

## 2025-08-01 PROCEDURE — 81001 URINALYSIS AUTO W/SCOPE: CPT | Performed by: EMERGENCY MEDICINE

## 2025-08-01 PROCEDURE — 36415 COLL VENOUS BLD VENIPUNCTURE: CPT | Performed by: EMERGENCY MEDICINE

## 2025-08-01 PROCEDURE — 85007 BL SMEAR W/DIFF WBC COUNT: CPT | Performed by: EMERGENCY MEDICINE

## 2025-08-01 PROCEDURE — 85027 COMPLETE CBC AUTOMATED: CPT | Performed by: EMERGENCY MEDICINE

## 2025-08-01 RX ADMIN — SODIUM CHLORIDE 1000 ML: 0.9 INJECTION, SOLUTION INTRAVENOUS at 17:20

## 2025-08-01 RX ADMIN — IOHEXOL 100 ML: 350 INJECTION, SOLUTION INTRAVENOUS at 16:42

## 2025-08-01 RX ADMIN — SODIUM CHLORIDE 1000 ML: 0.9 INJECTION, SOLUTION INTRAVENOUS at 16:05

## 2025-08-04 ENCOUNTER — TELEPHONE (OUTPATIENT)
Age: 55
End: 2025-08-04

## 2025-08-04 DIAGNOSIS — R63.4 WEIGHT LOSS: ICD-10-CM

## 2025-08-04 DIAGNOSIS — R10.9 ABDOMINAL PAIN, UNSPECIFIED ABDOMINAL LOCATION: Primary | ICD-10-CM

## 2025-08-04 LAB
ATRIAL RATE: 105 BPM
BASOPHILS # BLD MANUAL: 0 THOUSAND/UL (ref 0–0.1)
BASOPHILS NFR MAR MANUAL: 0 % (ref 0–1)
EOSINOPHIL # BLD MANUAL: 0 THOUSAND/UL (ref 0–0.4)
EOSINOPHIL NFR BLD MANUAL: 0 % (ref 0–6)
LYMPHOCYTES # BLD AUTO: 0.67 THOUSAND/UL (ref 0.6–4.47)
LYMPHOCYTES # BLD AUTO: 12 % (ref 14–44)
MACROCYTES BLD QL AUTO: PRESENT
MONOCYTES # BLD AUTO: 0.28 THOUSAND/UL (ref 0–1.22)
MONOCYTES NFR BLD: 5 % (ref 4–12)
MYELOCYTE ABSOLUTE CT: 0.06 THOUSAND/UL (ref 0–0.1)
MYELOCYTES NFR BLD MANUAL: 1 % (ref 0–1)
NEUTROPHILS # BLD MANUAL: 4.58 THOUSAND/UL (ref 1.85–7.62)
NEUTS SEG NFR BLD AUTO: 82 % (ref 43–75)
P AXIS: 70 DEGREES
PATHOLOGY REVIEW: YES
PLATELET BLD QL SMEAR: ABNORMAL
PR INTERVAL: 126 MS
QRS AXIS: 35 DEGREES
QRSD INTERVAL: 78 MS
QT INTERVAL: 326 MS
QTC INTERVAL: 430 MS
RBC MORPH BLD: PRESENT
T WAVE AXIS: 12 DEGREES
VENTRICULAR RATE: 105 BPM

## 2025-08-04 PROCEDURE — 85060 BLOOD SMEAR INTERPRETATION: CPT | Performed by: STUDENT IN AN ORGANIZED HEALTH CARE EDUCATION/TRAINING PROGRAM

## 2025-08-04 PROCEDURE — 93010 ELECTROCARDIOGRAM REPORT: CPT | Performed by: INTERNAL MEDICINE

## 2025-08-07 ENCOUNTER — OFFICE VISIT (OUTPATIENT)
Dept: INTERNAL MEDICINE CLINIC | Facility: CLINIC | Age: 55
End: 2025-08-07
Payer: COMMERCIAL

## 2025-08-07 ENCOUNTER — TELEPHONE (OUTPATIENT)
Dept: INTERNAL MEDICINE CLINIC | Facility: CLINIC | Age: 55
End: 2025-08-07

## 2025-08-07 ENCOUNTER — E-CONSULT (OUTPATIENT)
Age: 55
End: 2025-08-07
Payer: COMMERCIAL

## 2025-08-07 VITALS
OXYGEN SATURATION: 99 % | HEIGHT: 61 IN | DIASTOLIC BLOOD PRESSURE: 64 MMHG | HEART RATE: 91 BPM | BODY MASS INDEX: 23.03 KG/M2 | WEIGHT: 122 LBS | SYSTOLIC BLOOD PRESSURE: 108 MMHG | RESPIRATION RATE: 18 BRPM | TEMPERATURE: 98.5 F

## 2025-08-07 DIAGNOSIS — R74.8 ELEVATED LIVER ENZYMES: Primary | ICD-10-CM

## 2025-08-07 DIAGNOSIS — Z13.220 SCREENING CHOLESTEROL LEVEL: ICD-10-CM

## 2025-08-07 DIAGNOSIS — R19.7 INTERMITTENT DIARRHEA: ICD-10-CM

## 2025-08-07 DIAGNOSIS — E55.9 VITAMIN D DEFICIENCY: ICD-10-CM

## 2025-08-07 DIAGNOSIS — R63.4 WEIGHT LOSS: ICD-10-CM

## 2025-08-07 DIAGNOSIS — D69.6 THROMBOCYTOPENIA (HCC): Primary | ICD-10-CM

## 2025-08-07 DIAGNOSIS — L40.9 PSORIASIS: ICD-10-CM

## 2025-08-07 DIAGNOSIS — R74.8 ELEVATED LIVER ENZYMES: ICD-10-CM

## 2025-08-07 DIAGNOSIS — K21.9 GASTROESOPHAGEAL REFLUX DISEASE WITHOUT ESOPHAGITIS: ICD-10-CM

## 2025-08-07 DIAGNOSIS — D69.6 THROMBOCYTOPENIA (HCC): ICD-10-CM

## 2025-08-07 DIAGNOSIS — R53.83 OTHER FATIGUE: ICD-10-CM

## 2025-08-07 DIAGNOSIS — R79.89 LOW VITAMIN B12 LEVEL: ICD-10-CM

## 2025-08-07 DIAGNOSIS — R93.89 ABNORMAL CHEST X-RAY: ICD-10-CM

## 2025-08-07 DIAGNOSIS — Z13.1 SCREENING FOR DIABETES MELLITUS: ICD-10-CM

## 2025-08-07 DIAGNOSIS — F10.10 ETOH ABUSE: ICD-10-CM

## 2025-08-07 DIAGNOSIS — Z12.31 ENCOUNTER FOR SCREENING MAMMOGRAM FOR BREAST CANCER: ICD-10-CM

## 2025-08-07 PROCEDURE — 99451 NTRPROF PH1/NTRNET/EHR 5/>: CPT | Performed by: NURSE PRACTITIONER

## 2025-08-07 PROCEDURE — 99204 OFFICE O/P NEW MOD 45 MIN: CPT | Performed by: INTERNAL MEDICINE

## 2025-08-12 ENCOUNTER — APPOINTMENT (OUTPATIENT)
Dept: LAB | Facility: HOSPITAL | Age: 55
End: 2025-08-12
Attending: INTERNAL MEDICINE
Payer: COMMERCIAL

## 2025-08-14 ENCOUNTER — HOSPITAL ENCOUNTER (OUTPATIENT)
Dept: RADIOLOGY | Facility: HOSPITAL | Age: 55
Discharge: HOME | End: 2025-08-14
Attending: INTERNAL MEDICINE
Payer: COMMERCIAL

## 2025-08-18 ENCOUNTER — TELEPHONE (OUTPATIENT)
Age: 55
End: 2025-08-18

## 2025-08-19 DIAGNOSIS — M25.59 PAIN IN OTHER JOINT: ICD-10-CM

## 2025-08-19 DIAGNOSIS — Z87.2 HISTORY OF PSORIASIS: ICD-10-CM

## 2025-08-19 DIAGNOSIS — R76.8 ELEVATED ANTINUCLEAR ANTIBODY (ANA) LEVEL: Primary | ICD-10-CM

## 2025-08-20 ENCOUNTER — HOSPITAL ENCOUNTER (OUTPATIENT)
Dept: RADIOLOGY | Facility: HOSPITAL | Age: 55
Discharge: HOME | End: 2025-08-20
Attending: INTERNAL MEDICINE
Payer: COMMERCIAL

## 2025-08-20 ENCOUNTER — CONSULT (OUTPATIENT)
Dept: GASTROENTEROLOGY | Facility: CLINIC | Age: 55
End: 2025-08-20
Attending: INTERNAL MEDICINE
Payer: COMMERCIAL

## 2025-08-20 VITALS
DIASTOLIC BLOOD PRESSURE: 62 MMHG | WEIGHT: 127.8 LBS | TEMPERATURE: 98.4 F | BODY MASS INDEX: 24.15 KG/M2 | SYSTOLIC BLOOD PRESSURE: 118 MMHG

## 2025-08-20 DIAGNOSIS — R63.4 WEIGHT LOSS: ICD-10-CM

## 2025-08-20 DIAGNOSIS — R74.8 ELEVATED LIVER ENZYMES: ICD-10-CM

## 2025-08-20 DIAGNOSIS — R19.7 INTERMITTENT DIARRHEA: ICD-10-CM

## 2025-08-20 DIAGNOSIS — R93.89 ABNORMAL CHEST X-RAY: ICD-10-CM

## 2025-08-20 DIAGNOSIS — R10.9 ABDOMINAL PAIN, UNSPECIFIED ABDOMINAL LOCATION: ICD-10-CM

## 2025-08-20 DIAGNOSIS — K21.9 GASTROESOPHAGEAL REFLUX DISEASE WITHOUT ESOPHAGITIS: ICD-10-CM

## 2025-08-20 PROCEDURE — 71260 CT THORAX DX C+: CPT

## 2025-08-20 PROCEDURE — 99205 OFFICE O/P NEW HI 60 MIN: CPT | Performed by: INTERNAL MEDICINE

## 2025-08-20 RX ORDER — SODIUM CHLORIDE, SODIUM LACTATE, POTASSIUM CHLORIDE, CALCIUM CHLORIDE 600; 310; 30; 20 MG/100ML; MG/100ML; MG/100ML; MG/100ML
125 INJECTION, SOLUTION INTRAVENOUS CONTINUOUS
OUTPATIENT
Start: 2025-08-20

## 2025-08-20 RX ORDER — LORATADINE 10 MG/1
1 TABLET ORAL EVERY 24 HOURS
COMMUNITY

## 2025-08-20 RX ADMIN — IOHEXOL 85 ML: 350 INJECTION, SOLUTION INTRAVENOUS at 16:05

## 2025-08-21 ENCOUNTER — TELEPHONE (OUTPATIENT)
Dept: INTERNAL MEDICINE CLINIC | Facility: CLINIC | Age: 55
End: 2025-08-21

## 2025-08-21 DIAGNOSIS — K21.9 GASTROESOPHAGEAL REFLUX DISEASE WITHOUT ESOPHAGITIS: Primary | ICD-10-CM

## 2025-08-21 RX ORDER — PANTOPRAZOLE SODIUM 40 MG/1
40 TABLET, DELAYED RELEASE ORAL DAILY
Qty: 30 TABLET | Refills: 1 | Status: SHIPPED | OUTPATIENT
Start: 2025-08-21